# Patient Record
Sex: FEMALE | Race: ASIAN | NOT HISPANIC OR LATINO | Employment: FULL TIME | URBAN - METROPOLITAN AREA
[De-identification: names, ages, dates, MRNs, and addresses within clinical notes are randomized per-mention and may not be internally consistent; named-entity substitution may affect disease eponyms.]

---

## 2021-11-08 ENCOUNTER — APPOINTMENT (OUTPATIENT)
Dept: RADIOLOGY | Facility: HOSPITAL | Age: 21
End: 2021-11-08
Attending: EMERGENCY MEDICINE
Payer: COMMERCIAL

## 2021-11-08 ENCOUNTER — HOSPITAL ENCOUNTER (EMERGENCY)
Facility: HOSPITAL | Age: 21
Discharge: HOME | End: 2021-11-08
Attending: EMERGENCY MEDICINE | Admitting: EMERGENCY MEDICINE
Payer: COMMERCIAL

## 2021-11-08 VITALS
WEIGHT: 180 LBS | HEART RATE: 59 BPM | TEMPERATURE: 97.8 F | DIASTOLIC BLOOD PRESSURE: 95 MMHG | RESPIRATION RATE: 12 BRPM | OXYGEN SATURATION: 99 % | BODY MASS INDEX: 33.13 KG/M2 | HEIGHT: 62 IN | SYSTOLIC BLOOD PRESSURE: 150 MMHG

## 2021-11-08 DIAGNOSIS — R07.82 INTERCOSTAL PAIN: ICD-10-CM

## 2021-11-08 DIAGNOSIS — R07.89 CHEST WALL PAIN: ICD-10-CM

## 2021-11-08 LAB
ALBUMIN SERPL-MCNC: 4.5 G/DL (ref 3.5–5)
ALP SERPL-CCNC: 81 U/L (ref 45–120)
ALT SERPL W P-5'-P-CCNC: 19 U/L (ref 0–45)
ANION GAP SERPL CALCULATED.3IONS-SCNC: 7 MMOL/L (ref 5–18)
AST SERPL W P-5'-P-CCNC: 18 U/L (ref 0–40)
ATRIAL RATE - MUSE: 64 BPM
BASOPHILS # BLD AUTO: 0 10E3/UL (ref 0–0.2)
BASOPHILS NFR BLD AUTO: 1 %
BILIRUB SERPL-MCNC: 0.5 MG/DL (ref 0–1)
BUN SERPL-MCNC: 10 MG/DL (ref 8–22)
CALCIUM SERPL-MCNC: 9.9 MG/DL (ref 8.5–10.5)
CHLORIDE BLD-SCNC: 105 MMOL/L (ref 98–107)
CO2 SERPL-SCNC: 27 MMOL/L (ref 22–31)
CREAT SERPL-MCNC: 0.8 MG/DL (ref 0.6–1.1)
D DIMER PPP FEU-MCNC: 0.3 UG/ML FEU (ref 0–0.5)
DIASTOLIC BLOOD PRESSURE - MUSE: NORMAL MMHG
EOSINOPHIL # BLD AUTO: 0.1 10E3/UL (ref 0–0.7)
EOSINOPHIL NFR BLD AUTO: 2 %
ERYTHROCYTE [DISTWIDTH] IN BLOOD BY AUTOMATED COUNT: 12.4 % (ref 10–15)
GFR SERPL CREATININE-BSD FRML MDRD: >90 ML/MIN/1.73M2
GLUCOSE BLD-MCNC: 98 MG/DL (ref 70–125)
HCG UR QL: NEGATIVE
HCT VFR BLD AUTO: 42.6 % (ref 35–47)
HGB BLD-MCNC: 14.1 G/DL (ref 11.7–15.7)
IMM GRANULOCYTES # BLD: 0 10E3/UL
IMM GRANULOCYTES NFR BLD: 0 %
INTERNAL QC OK POCT: NORMAL
INTERPRETATION ECG - MUSE: NORMAL
LIPASE SERPL-CCNC: <9 U/L (ref 0–52)
LYMPHOCYTES # BLD AUTO: 2.2 10E3/UL (ref 0.8–5.3)
LYMPHOCYTES NFR BLD AUTO: 34 %
MCH RBC QN AUTO: 28.8 PG (ref 26.5–33)
MCHC RBC AUTO-ENTMCNC: 33.1 G/DL (ref 31.5–36.5)
MCV RBC AUTO: 87 FL (ref 78–100)
MONOCYTES # BLD AUTO: 0.5 10E3/UL (ref 0–1.3)
MONOCYTES NFR BLD AUTO: 8 %
NEUTROPHILS # BLD AUTO: 3.6 10E3/UL (ref 1.6–8.3)
NEUTROPHILS NFR BLD AUTO: 55 %
NRBC # BLD AUTO: 0 10E3/UL
NRBC BLD AUTO-RTO: 0 /100
P AXIS - MUSE: 36 DEGREES
PLATELET # BLD AUTO: 324 10E3/UL (ref 150–450)
POTASSIUM BLD-SCNC: 3.8 MMOL/L (ref 3.5–5)
PR INTERVAL - MUSE: 136 MS
PROT SERPL-MCNC: 7.8 G/DL (ref 6–8)
QRS DURATION - MUSE: 80 MS
QT - MUSE: 430 MS
QTC - MUSE: 443 MS
R AXIS - MUSE: 102 DEGREES
RBC # BLD AUTO: 4.9 10E6/UL (ref 3.8–5.2)
SODIUM SERPL-SCNC: 139 MMOL/L (ref 136–145)
SYSTOLIC BLOOD PRESSURE - MUSE: NORMAL MMHG
T AXIS - MUSE: 37 DEGREES
TROPONIN I SERPL-MCNC: <0.01 NG/ML (ref 0–0.29)
VENTRICULAR RATE- MUSE: 64 BPM
WBC # BLD AUTO: 6.5 10E3/UL (ref 4–11)

## 2021-11-08 PROCEDURE — 36415 COLL VENOUS BLD VENIPUNCTURE: CPT | Performed by: EMERGENCY MEDICINE

## 2021-11-08 PROCEDURE — 96374 THER/PROPH/DIAG INJ IV PUSH: CPT

## 2021-11-08 PROCEDURE — 80053 COMPREHEN METABOLIC PANEL: CPT | Performed by: EMERGENCY MEDICINE

## 2021-11-08 PROCEDURE — 85025 COMPLETE CBC W/AUTO DIFF WBC: CPT | Performed by: EMERGENCY MEDICINE

## 2021-11-08 PROCEDURE — 71046 X-RAY EXAM CHEST 2 VIEWS: CPT

## 2021-11-08 PROCEDURE — 99285 EMERGENCY DEPT VISIT HI MDM: CPT | Mod: 25

## 2021-11-08 PROCEDURE — 83690 ASSAY OF LIPASE: CPT | Performed by: EMERGENCY MEDICINE

## 2021-11-08 PROCEDURE — 81025 URINE PREGNANCY TEST: CPT | Performed by: EMERGENCY MEDICINE

## 2021-11-08 PROCEDURE — 84484 ASSAY OF TROPONIN QUANT: CPT | Performed by: EMERGENCY MEDICINE

## 2021-11-08 PROCEDURE — 93005 ELECTROCARDIOGRAM TRACING: CPT | Performed by: EMERGENCY MEDICINE

## 2021-11-08 PROCEDURE — 85379 FIBRIN DEGRADATION QUANT: CPT | Performed by: EMERGENCY MEDICINE

## 2021-11-08 PROCEDURE — 250N000011 HC RX IP 250 OP 636: Performed by: EMERGENCY MEDICINE

## 2021-11-08 PROCEDURE — 93005 ELECTROCARDIOGRAM TRACING: CPT | Performed by: STUDENT IN AN ORGANIZED HEALTH CARE EDUCATION/TRAINING PROGRAM

## 2021-11-08 RX ORDER — KETOROLAC TROMETHAMINE 15 MG/ML
15 INJECTION, SOLUTION INTRAMUSCULAR; INTRAVENOUS ONCE
Status: COMPLETED | OUTPATIENT
Start: 2021-11-08 | End: 2021-11-08

## 2021-11-08 RX ADMIN — KETOROLAC TROMETHAMINE 15 MG: 15 INJECTION, SOLUTION INTRAMUSCULAR; INTRAVENOUS at 21:41

## 2021-11-08 ASSESSMENT — ENCOUNTER SYMPTOMS
VOMITING: 0
BACK PAIN: 1
FATIGUE: 1
NAUSEA: 0
COUGH: 0
BLOOD IN STOOL: 1
SHORTNESS OF BREATH: 1
FEVER: 0

## 2021-11-08 ASSESSMENT — MIFFLIN-ST. JEOR: SCORE: 1529.72

## 2021-11-09 NOTE — ED NOTES
She says the pain started four days ago when she woke up. She had not been doing anything at the time the pain started. Nor had she been doing anything she feels could have caused a muscle strain. The pain is worse when she move is her left arm around. She states the pain is left chest radiating to mid low left back.

## 2021-11-09 NOTE — ED PROVIDER NOTES
NAME: Elisabet Logan  AGE: 21 year old female  YOB: 2000  MRN: [id number]  EVALUATION DATE & TIME: 2021  8:49 PM    PCP: No primary care provider on file.    ED PROVIDER: Grayson Mc M.D.      Chief Complaint   Patient presents with     Chest Pain     FINAL IMPRESSION:  1. Intercostal pain    2. Chest wall pain      MEDICAL DECISION MAKIN:10 PM I met with the patient, obtained history, performed an initial exam, and discussed options and plan for diagnostics and treatment here in the ED.   10:17 PM I rechecked on the patient and the Toradol helped with her pain.      Pertinent Labs & Imaging studies reviewed. (See chart for details)       Patient was clinically assessed and consented to treatment. After assessment, medical decision making and workup were discussed with the patient. The patient was agreeable to plan for testing, workup, and treatment.    Elisabet Logan is a 21 year old female who presents with chest pain   Differential diagnosis includes but not limited to acute coronary syndrome, pleuritis, pulmonary embolism, musculoskeletal pain, pneumonia.  Patient presenting for 3 days of chest pain has been mostly constant aching.  She is tender when she pushes on the muscle but also feels slightly deeper.  No nausea or vomiting, no burning, she does feel it when she takes deeper breaths but does not service sharp or pleuritic.  Patient has no past medical problems and EKG did not show any signs of ischemia.  No congenital arrhythmias.  Labs were obtained including D-dimer and showed negative D-dimer for pulmonary embolism along with negative troponin and unremarkable CBC, metabolic panel, and lipase.  After negative D-dimer and a dose of Toradol patient did report the Toradol seemed to help with the pain and chest x-ray was obtained.  Chest x-ray was clear and feel patient likely with chest wall pain would be plan for discharge home with follow-up to primary care for  further work-up.  Patient only slightly hypertensive here in the ER and I would not worry about repeat troponin given that she has had 3 days of constant pain along with no history of high blood pressure.  Patient is also not pregnant and no concerns from that standpoint causing chest pain.  Signs are otherwise within normal limits fractionation and pulse and after some relief with Toradol will be plan for discharge home with recommendations for ibuprofen as needed and close follow-up to establish primary care and follow-up on pain.    The importance of close follow up was discussed. We reviewed warning signs and symptoms, and I instructed Ms. Jn Logan to return to the emergency department immediately if she develops any new or worsening symptoms. I provided additional verbal discharge instructions. Ms. nJ Logan expressed understanding and agreement with this plan of care, her questions were answered, and she was discharged in stable condition.       0 minutes of critical care time    MEDICATIONS GIVEN IN THE EMERGENCY:  Medications   ketorolac (TORADOL) injection 15 mg (15 mg Intravenous Given 11/8/21 2141)       NEW PRESCRIPTIONS STARTED AT TODAY'S ER VISIT:  Discharge Medication List as of 11/8/2021 10:39 PM             =================================================================    HPI    Patient information was obtained from: Patient    Use of : N/A       Elisabet DAVID Jn Logan is a 21 year old female with no pertinent past medical history, who presents with chest pain.    The patient reports left-sided chest pain for the last two days.  She reports she awoke with the pain on initial onset, and the pain has been constant and radiates to her back.  She denies the pain as a squeezing. She reports it is worse with breathing deeply.  She tried cupping to the area without relief of pain.  She reports associated shortness of breath, fatigue, and intermittent racing HR.  She also reports one bright red bloody  bowel movement recently.  She denies any medical problems.  She is not on birth control or a smoker.  No pregnancy concern.  No recent long travel.  She denies any nausea, vomiting, rash, cough, fevers, leg pain or swelling, or other complaints at this time.      REVIEW OF SYSTEMS   Review of Systems   Constitutional: Positive for fatigue. Negative for fever.   Respiratory: Positive for shortness of breath. Negative for cough.    Cardiovascular: Positive for chest pain (left into back). Negative for leg swelling.        Positive for intermittent racing HR     Gastrointestinal: Positive for blood in stool (bright red, one time). Negative for nausea and vomiting.   Musculoskeletal: Positive for back pain. Gait problem: left from radiating CP.        Negative for leg pain   Skin: Negative for rash.   All other systems reviewed and are negative.     PAST MEDICAL HISTORY:  Denies    PAST SURGICAL HISTORY:  No past surgical history on file.    CURRENT MEDICATIONS:    No current facility-administered medications for this encounter.    Current Outpatient Medications:      phenazopyridine (PYRIDIUM) 200 MG tablet, [PHENAZOPYRIDINE (PYRIDIUM) 200 MG TABLET] Take 1 tablet (200 mg total) by mouth 3 (three) times a day., Disp: 6 tablet, Rfl: 0    ALLERGIES:  No Known Allergies    FAMILY HISTORY:  No family history on file.    SOCIAL HISTORY:   Social History     Socioeconomic History     Marital status:      Spouse name: Not on file     Number of children: Not on file     Years of education: Not on file     Highest education level: Not on file   Occupational History     Not on file   Tobacco Use     Smoking status: Not on file     Smokeless tobacco: Not on file   Substance and Sexual Activity     Alcohol use: Not on file     Drug use: Not on file     Sexual activity: Not on file   Other Topics Concern     Not on file   Social History Narrative     Not on file     Social Determinants of Health     Financial Resource  "Strain: Not on file   Food Insecurity: Not on file   Transportation Needs: Not on file   Physical Activity: Not on file   Stress: Not on file   Social Connections: Not on file   Intimate Partner Violence: Not on file   Housing Stability: Not on file       PHYSICAL EXAM:    Vitals: BP (!) 150/95   Pulse 59   Temp 97.8  F (36.6  C) (Temporal)   Resp 12   Ht 1.575 m (5' 2\")   Wt 81.6 kg (180 lb)   LMP 11/08/2021   SpO2 99%   BMI 32.92 kg/m     Physical Exam  Vitals and nursing note reviewed.   Constitutional:       General: She is not in acute distress.     Appearance: She is well-developed and normal weight. She is not ill-appearing or toxic-appearing.   HENT:      Head: Normocephalic.   Cardiovascular:      Rate and Rhythm: Normal rate and regular rhythm.      Heart sounds: Normal heart sounds.   Pulmonary:      Effort: Pulmonary effort is normal. No tachypnea, accessory muscle usage or respiratory distress.      Breath sounds: Normal breath sounds. No stridor.   Chest:      Chest wall: Tenderness present. No crepitus.       Abdominal:      Palpations: Abdomen is soft.      Tenderness: There is no abdominal tenderness.   Musculoskeletal:         General: Normal range of motion.      Cervical back: Normal range of motion.      Right lower leg: No tenderness. No edema.      Left lower leg: No tenderness. No edema.   Skin:     General: Skin is warm and dry.      Coloration: Skin is not cyanotic or pale.   Neurological:      General: No focal deficit present.      Mental Status: She is alert.   Psychiatric:         Mood and Affect: Mood normal.           LAB:  All pertinent labs reviewed and interpreted.  Labs Ordered and Resulted from Time of ED Arrival to Time of ED Departure   D DIMER QUANTITATIVE - Normal       Result Value    D-Dimer Quantitative 0.30     COMPREHENSIVE METABOLIC PANEL - Normal    Sodium 139      Potassium 3.8      Chloride 105      Carbon Dioxide (CO2) 27      Anion Gap 7      Urea Nitrogen " 10      Creatinine 0.80      Calcium 9.9      Glucose 98      Alkaline Phosphatase 81      AST 18      ALT 19      Protein Total 7.8      Albumin 4.5      Bilirubin Total 0.5      GFR Estimate >90     LIPASE - Normal    Lipase <9     TROPONIN I - Normal    Troponin I <0.01     HCG QUALITATIVE URINE POCT - Normal    HCG Qual Urine Negative      Internal QC Check POCT Valid     CBC WITH PLATELETS AND DIFFERENTIAL    WBC Count 6.5      RBC Count 4.90      Hemoglobin 14.1      Hematocrit 42.6      MCV 87      MCH 28.8      MCHC 33.1      RDW 12.4      Platelet Count 324      % Neutrophils 55      % Lymphocytes 34      % Monocytes 8      % Eosinophils 2      % Basophils 1      % Immature Granulocytes 0      NRBCs per 100 WBC 0      Absolute Neutrophils 3.6      Absolute Lymphocytes 2.2      Absolute Monocytes 0.5      Absolute Eosinophils 0.1      Absolute Basophils 0.0      Absolute Immature Granulocytes 0.0      Absolute NRBCs 0.0         RADIOLOGY:  XR Chest 2 Views   Final Result   IMPRESSION: Negative chest.        EKG:   Performed at: 7:30 PM on November 8, 2021.  Impression: Sinus rhythm with sinus arrhythmia, no signs of acute ischemia or congenital arrhythmia.  Rate: 64 bpm  Rhythm: Sinus rhythm with sinus arrhythmia  QRS Interval: 80 ms  QTc Interval: 443 ms  Comparison: No old EKG for comparison.  I have independently reviewed and interpreted the EKG(s) documented above.     PROCEDURES:   Procedures       I, Davion Tinsley, am serving as a scribe to document services personally performed by Dr. Grayson Mc  based on my observation and the provider's statements to me. I, Grayson Mc MD attest that Davion Tinsley is acting in a scribe capacity, has observed my performance of the services and has documented them in accordance with my direction.      Grayson Mc M.D.  Emergency Medicine  [facility] EMERGENCY DEPARTMENT  [address]  [place] [address]  [phone]  Dept: [phone]     Grayson Mc MD  11/09/21 040

## 2021-11-09 NOTE — ED TRIAGE NOTES
Pt reports left sided chest squeezing that has been constant since Friday. Pt also reports x1 BM today with bright red blood noted. Denies hemorrhoids and having blood in stool before.    Denies medical hx. Denies hx of blood clots in lungs or legs.    Will obtain EKG in triage.

## 2023-02-08 LAB
HEPATITIS B SURFACE ANTIGEN (EXTERNAL): NONREACTIVE
HIV1+2 AB SERPL QL IA: NONREACTIVE
RUBELLA ANTIBODY IGG (EXTERNAL): NORMAL
TREPONEMA PALLIDUM ANTIBODY (EXTERNAL): NONREACTIVE

## 2023-05-24 ENCOUNTER — MEDICAL CORRESPONDENCE (OUTPATIENT)
Dept: HEALTH INFORMATION MANAGEMENT | Facility: CLINIC | Age: 23
End: 2023-05-24

## 2023-05-25 ENCOUNTER — TRANSCRIBE ORDERS (OUTPATIENT)
Dept: MATERNAL FETAL MEDICINE | Facility: HOSPITAL | Age: 23
End: 2023-05-25

## 2023-05-25 DIAGNOSIS — O26.90 PREGNANCY RELATED CONDITION, ANTEPARTUM: Primary | ICD-10-CM

## 2023-06-01 ENCOUNTER — PRE VISIT (OUTPATIENT)
Dept: MATERNAL FETAL MEDICINE | Facility: HOSPITAL | Age: 23
End: 2023-06-01

## 2023-06-05 ENCOUNTER — ANCILLARY PROCEDURE (OUTPATIENT)
Dept: ULTRASOUND IMAGING | Facility: HOSPITAL | Age: 23
End: 2023-06-05
Attending: OBSTETRICS & GYNECOLOGY
Payer: COMMERCIAL

## 2023-06-05 ENCOUNTER — OFFICE VISIT (OUTPATIENT)
Dept: MATERNAL FETAL MEDICINE | Facility: HOSPITAL | Age: 23
End: 2023-06-05
Attending: OBSTETRICS & GYNECOLOGY
Payer: COMMERCIAL

## 2023-06-05 DIAGNOSIS — O26.90 PREGNANCY RELATED CONDITION, ANTEPARTUM: ICD-10-CM

## 2023-06-05 DIAGNOSIS — Z03.73 SUSPECTED FETAL ANOMALY NOT FOUND: Primary | ICD-10-CM

## 2023-06-05 PROCEDURE — 99207 PR NO CHARGE LOS: CPT | Performed by: OBSTETRICS & GYNECOLOGY

## 2023-06-05 PROCEDURE — 76811 OB US DETAILED SNGL FETUS: CPT | Mod: 26 | Performed by: OBSTETRICS & GYNECOLOGY

## 2023-06-05 PROCEDURE — 76811 OB US DETAILED SNGL FETUS: CPT

## 2023-06-05 NOTE — NURSING NOTE
Elisabet Ragsdale Desmond is a  at 24w1d who presents to [facility] for L2 ultrasound. Pt reports positive fetal movement. Pt denies bldg/lof/change in discharge, contractions, headache, vision changes, chest pain/SOB or edema. SBAR given to Dr. Jin, see note in Epic.

## 2023-06-05 NOTE — PROGRESS NOTES
Please see the imaging tab for details of the ultrasound performed today.    Virgie Jin MD  Specialist in Maternal-Fetal Medicine

## 2023-06-08 ENCOUNTER — TRANSFERRED RECORDS (OUTPATIENT)
Dept: HEALTH INFORMATION MANAGEMENT | Facility: CLINIC | Age: 23
End: 2023-06-08
Payer: COMMERCIAL

## 2023-06-26 ENCOUNTER — ANCILLARY PROCEDURE (OUTPATIENT)
Dept: ULTRASOUND IMAGING | Facility: HOSPITAL | Age: 23
End: 2023-06-26
Attending: OBSTETRICS & GYNECOLOGY
Payer: COMMERCIAL

## 2023-06-26 ENCOUNTER — OFFICE VISIT (OUTPATIENT)
Dept: MATERNAL FETAL MEDICINE | Facility: HOSPITAL | Age: 23
End: 2023-06-26
Attending: OBSTETRICS & GYNECOLOGY
Payer: COMMERCIAL

## 2023-06-26 DIAGNOSIS — Z36.2 ENCOUNTER FOR FOLLOW-UP ULTRASOUND OF FETAL ANATOMY: Primary | ICD-10-CM

## 2023-06-26 DIAGNOSIS — Z03.73 SUSPECTED FETAL ANOMALY NOT FOUND: ICD-10-CM

## 2023-06-26 PROCEDURE — 76816 OB US FOLLOW-UP PER FETUS: CPT | Mod: 26 | Performed by: STUDENT IN AN ORGANIZED HEALTH CARE EDUCATION/TRAINING PROGRAM

## 2023-06-26 PROCEDURE — 99207 PR NO CHARGE LOS: CPT | Performed by: STUDENT IN AN ORGANIZED HEALTH CARE EDUCATION/TRAINING PROGRAM

## 2023-06-26 PROCEDURE — 76816 OB US FOLLOW-UP PER FETUS: CPT

## 2023-06-26 NOTE — PROGRESS NOTES
Please see the full imaging report from the [employer] program under the imaging tab.    Savanna Ortega MD  Maternal Fetal Medicine

## 2023-06-26 NOTE — PROGRESS NOTES
Elisabet Ragsdale Desmond is a  at 27w1d who presents to [facility] for follow up growth US. Pt reports positive fetal movement. Pt denies bldg/lof/change in discharge, contractions, headache, vision changes, chest pain/SOB or edema. SBAR given to Dr. Otrega, see note in Epic.

## 2023-07-31 ENCOUNTER — HOSPITAL ENCOUNTER (OUTPATIENT)
Facility: HOSPITAL | Age: 23
End: 2023-07-31
Admitting: ADVANCED PRACTICE MIDWIFE
Payer: COMMERCIAL

## 2023-08-06 ENCOUNTER — HEALTH MAINTENANCE LETTER (OUTPATIENT)
Age: 23
End: 2023-08-06

## 2023-09-12 ENCOUNTER — HOSPITAL ENCOUNTER (INPATIENT)
Facility: CLINIC | Age: 23
LOS: 3 days | Discharge: HOME | End: 2023-09-15
Attending: ADVANCED PRACTICE MIDWIFE | Admitting: ADVANCED PRACTICE MIDWIFE
Payer: COMMERCIAL

## 2023-09-12 DIAGNOSIS — O13.3 GESTATIONAL HYPERTENSION, THIRD TRIMESTER: ICD-10-CM

## 2023-09-12 PROBLEM — Z34.90 ENCOUNTER FOR INDUCTION OF LABOR: Status: ACTIVE | Noted: 2023-09-12

## 2023-09-12 PROBLEM — Z36.89 ENCOUNTER FOR TRIAGE IN PREGNANT PATIENT: Status: ACTIVE | Noted: 2023-09-12

## 2023-09-12 LAB
ALBUMIN MFR UR ELPH: 9.4 MG/DL
ALBUMIN SERPL BCG-MCNC: 3.6 G/DL (ref 3.5–5.2)
ALP SERPL-CCNC: 187 U/L (ref 35–104)
ALT SERPL W P-5'-P-CCNC: 7 U/L (ref 0–50)
ANION GAP SERPL CALCULATED.3IONS-SCNC: 12 MMOL/L (ref 7–15)
AST SERPL W P-5'-P-CCNC: 16 U/L (ref 0–45)
BILIRUB SERPL-MCNC: 0.2 MG/DL
BUN SERPL-MCNC: 6.7 MG/DL (ref 6–20)
CALCIUM SERPL-MCNC: 8.6 MG/DL (ref 8.6–10)
CHLORIDE SERPL-SCNC: 105 MMOL/L (ref 98–107)
CREAT SERPL-MCNC: 0.6 MG/DL (ref 0.51–0.95)
CREAT UR-MCNC: 33.9 MG/DL
DEPRECATED HCO3 PLAS-SCNC: 23 MMOL/L (ref 22–29)
EGFRCR SERPLBLD CKD-EPI 2021: >90 ML/MIN/1.73M2
ERYTHROCYTE [DISTWIDTH] IN BLOOD BY AUTOMATED COUNT: 13.1 % (ref 10–15)
GLUCOSE SERPL-MCNC: 96 MG/DL (ref 70–99)
HCT VFR BLD AUTO: 34.6 % (ref 35–47)
HGB BLD-MCNC: 11.3 G/DL (ref 11.7–15.7)
HOLD SPECIMEN: NORMAL
HOLD SPECIMEN: NORMAL
MCH RBC QN AUTO: 28.4 PG (ref 26.5–33)
MCHC RBC AUTO-ENTMCNC: 32.7 G/DL (ref 31.5–36.5)
MCV RBC AUTO: 87 FL (ref 78–100)
PLATELET # BLD AUTO: 196 10E3/UL (ref 150–450)
POTASSIUM SERPL-SCNC: 4.2 MMOL/L (ref 3.4–5.3)
PROT SERPL-MCNC: 6.3 G/DL (ref 6.4–8.3)
PROT/CREAT 24H UR: 0.28 MG/MG CR (ref 0–0.2)
RBC # BLD AUTO: 3.98 10E6/UL (ref 3.8–5.2)
SODIUM SERPL-SCNC: 140 MMOL/L (ref 136–145)
WBC # BLD AUTO: 5.5 10E3/UL (ref 4–11)

## 2023-09-12 PROCEDURE — 80053 COMPREHEN METABOLIC PANEL: CPT | Performed by: ADVANCED PRACTICE MIDWIFE

## 2023-09-12 PROCEDURE — 86901 BLOOD TYPING SEROLOGIC RH(D): CPT | Performed by: REGISTERED NURSE

## 2023-09-12 PROCEDURE — 84156 ASSAY OF PROTEIN URINE: CPT | Performed by: ADVANCED PRACTICE MIDWIFE

## 2023-09-12 PROCEDURE — 120N000001 HC R&B MED SURG/OB

## 2023-09-12 PROCEDURE — 86850 RBC ANTIBODY SCREEN: CPT | Performed by: REGISTERED NURSE

## 2023-09-12 PROCEDURE — 36415 COLL VENOUS BLD VENIPUNCTURE: CPT | Performed by: ADVANCED PRACTICE MIDWIFE

## 2023-09-12 PROCEDURE — 3E0P7GC INTRODUCTION OF OTHER THERAPEUTIC SUBSTANCE INTO FEMALE REPRODUCTIVE, VIA NATURAL OR ARTIFICIAL OPENING: ICD-10-PCS | Performed by: ADVANCED PRACTICE MIDWIFE

## 2023-09-12 PROCEDURE — 250N000013 HC RX MED GY IP 250 OP 250 PS 637: Performed by: ADVANCED PRACTICE MIDWIFE

## 2023-09-12 PROCEDURE — 85027 COMPLETE CBC AUTOMATED: CPT | Performed by: ADVANCED PRACTICE MIDWIFE

## 2023-09-12 RX ORDER — METOCLOPRAMIDE HYDROCHLORIDE 5 MG/ML
10 INJECTION INTRAMUSCULAR; INTRAVENOUS EVERY 6 HOURS PRN
Status: DISCONTINUED | OUTPATIENT
Start: 2023-09-12 | End: 2023-09-13 | Stop reason: HOSPADM

## 2023-09-12 RX ORDER — IBUPROFEN 800 MG/1
800 TABLET, FILM COATED ORAL
Status: DISCONTINUED | OUTPATIENT
Start: 2023-09-12 | End: 2023-09-13 | Stop reason: HOSPADM

## 2023-09-12 RX ORDER — ACETAMINOPHEN 325 MG/1
650 TABLET ORAL EVERY 4 HOURS PRN
Status: DISCONTINUED | OUTPATIENT
Start: 2023-09-12 | End: 2023-09-13 | Stop reason: HOSPADM

## 2023-09-12 RX ORDER — NALOXONE HYDROCHLORIDE 0.4 MG/ML
0.2 INJECTION, SOLUTION INTRAMUSCULAR; INTRAVENOUS; SUBCUTANEOUS
Status: DISCONTINUED | OUTPATIENT
Start: 2023-09-12 | End: 2023-09-13 | Stop reason: HOSPADM

## 2023-09-12 RX ORDER — MISOPROSTOL 200 UG/1
800 TABLET ORAL
Status: DISCONTINUED | OUTPATIENT
Start: 2023-09-12 | End: 2023-09-13 | Stop reason: HOSPADM

## 2023-09-12 RX ORDER — NALOXONE HYDROCHLORIDE 0.4 MG/ML
0.4 INJECTION, SOLUTION INTRAMUSCULAR; INTRAVENOUS; SUBCUTANEOUS
Status: DISCONTINUED | OUTPATIENT
Start: 2023-09-12 | End: 2023-09-13 | Stop reason: HOSPADM

## 2023-09-12 RX ORDER — METHYLERGONOVINE MALEATE 0.2 MG/ML
200 INJECTION INTRAVENOUS
Status: DISCONTINUED | OUTPATIENT
Start: 2023-09-12 | End: 2023-09-13 | Stop reason: HOSPADM

## 2023-09-12 RX ORDER — CALCIUM CARBONATE 500 MG/1
500 TABLET, CHEWABLE ORAL 3 TIMES DAILY PRN
Status: DISCONTINUED | OUTPATIENT
Start: 2023-09-12 | End: 2023-09-13 | Stop reason: HOSPADM

## 2023-09-12 RX ORDER — ONDANSETRON 4 MG/1
4 TABLET, ORALLY DISINTEGRATING ORAL EVERY 6 HOURS PRN
Status: DISCONTINUED | OUTPATIENT
Start: 2023-09-12 | End: 2023-09-13 | Stop reason: HOSPADM

## 2023-09-12 RX ORDER — LABETALOL HYDROCHLORIDE 5 MG/ML
20 INJECTION, SOLUTION INTRAVENOUS
Status: DISCONTINUED | OUTPATIENT
Start: 2023-09-12 | End: 2023-09-13 | Stop reason: HOSPADM

## 2023-09-12 RX ORDER — SODIUM CHLORIDE, SODIUM LACTATE, POTASSIUM CHLORIDE, CALCIUM CHLORIDE 600; 310; 30; 20 MG/100ML; MG/100ML; MG/100ML; MG/100ML
10-125 INJECTION, SOLUTION INTRAVENOUS CONTINUOUS
Status: DISCONTINUED | OUTPATIENT
Start: 2023-09-12 | End: 2023-09-13 | Stop reason: HOSPADM

## 2023-09-12 RX ORDER — KETOROLAC TROMETHAMINE 30 MG/ML
30 INJECTION, SOLUTION INTRAMUSCULAR; INTRAVENOUS
Status: DISCONTINUED | OUTPATIENT
Start: 2023-09-12 | End: 2023-09-13 | Stop reason: HOSPADM

## 2023-09-12 RX ORDER — MISOPROSTOL 200 UG/1
400 TABLET ORAL
Status: DISCONTINUED | OUTPATIENT
Start: 2023-09-12 | End: 2023-09-13 | Stop reason: HOSPADM

## 2023-09-12 RX ORDER — CITRIC ACID/SODIUM CITRATE 334-500MG
30 SOLUTION, ORAL ORAL
Status: DISCONTINUED | OUTPATIENT
Start: 2023-09-12 | End: 2023-09-13 | Stop reason: HOSPADM

## 2023-09-12 RX ORDER — PROCHLORPERAZINE 25 MG
25 SUPPOSITORY, RECTAL RECTAL EVERY 12 HOURS PRN
Status: DISCONTINUED | OUTPATIENT
Start: 2023-09-12 | End: 2023-09-13 | Stop reason: HOSPADM

## 2023-09-12 RX ORDER — PROCHLORPERAZINE MALEATE 10 MG
10 TABLET ORAL EVERY 6 HOURS PRN
Status: DISCONTINUED | OUTPATIENT
Start: 2023-09-12 | End: 2023-09-13 | Stop reason: HOSPADM

## 2023-09-12 RX ORDER — ONDANSETRON 2 MG/ML
4 INJECTION INTRAMUSCULAR; INTRAVENOUS EVERY 6 HOURS PRN
Status: DISCONTINUED | OUTPATIENT
Start: 2023-09-12 | End: 2023-09-13 | Stop reason: HOSPADM

## 2023-09-12 RX ORDER — PENICILLIN G POTASSIUM 5000000 [IU]/1
5 INJECTION, POWDER, FOR SOLUTION INTRAMUSCULAR; INTRAVENOUS ONCE
Status: COMPLETED | OUTPATIENT
Start: 2023-09-12 | End: 2023-09-13

## 2023-09-12 RX ORDER — METOCLOPRAMIDE 10 MG/1
10 TABLET ORAL EVERY 6 HOURS PRN
Status: DISCONTINUED | OUTPATIENT
Start: 2023-09-12 | End: 2023-09-13 | Stop reason: HOSPADM

## 2023-09-12 RX ORDER — NIFEDIPINE 10 MG/1
10-20 CAPSULE ORAL
Status: DISCONTINUED | OUTPATIENT
Start: 2023-09-12 | End: 2023-09-13 | Stop reason: HOSPADM

## 2023-09-12 RX ORDER — FENTANYL CITRATE 50 UG/ML
50 INJECTION, SOLUTION INTRAMUSCULAR; INTRAVENOUS EVERY 30 MIN PRN
Status: DISCONTINUED | OUTPATIENT
Start: 2023-09-12 | End: 2023-09-13 | Stop reason: HOSPADM

## 2023-09-12 RX ORDER — OXYTOCIN 10 [USP'U]/ML
10 INJECTION, SOLUTION INTRAMUSCULAR; INTRAVENOUS
Status: DISCONTINUED | OUTPATIENT
Start: 2023-09-12 | End: 2023-09-13 | Stop reason: HOSPADM

## 2023-09-12 RX ORDER — OXYTOCIN/0.9 % SODIUM CHLORIDE 30/500 ML
340 PLASTIC BAG, INJECTION (ML) INTRAVENOUS CONTINUOUS PRN
Status: COMPLETED | OUTPATIENT
Start: 2023-09-12 | End: 2023-09-13

## 2023-09-12 RX ORDER — PENICILLIN G 3000000 [IU]/50ML
3 INJECTION, SOLUTION INTRAVENOUS EVERY 4 HOURS
Status: DISCONTINUED | OUTPATIENT
Start: 2023-09-12 | End: 2023-09-13 | Stop reason: HOSPADM

## 2023-09-12 RX ORDER — CARBOPROST TROMETHAMINE 250 UG/ML
250 INJECTION, SOLUTION INTRAMUSCULAR
Status: DISCONTINUED | OUTPATIENT
Start: 2023-09-12 | End: 2023-09-13 | Stop reason: HOSPADM

## 2023-09-12 RX ORDER — OXYTOCIN/0.9 % SODIUM CHLORIDE 30/500 ML
100-340 PLASTIC BAG, INJECTION (ML) INTRAVENOUS CONTINUOUS PRN
Status: DISCONTINUED | OUTPATIENT
Start: 2023-09-12 | End: 2023-09-13 | Stop reason: HOSPADM

## 2023-09-12 RX ORDER — MORPHINE SULFATE 10 MG/ML
10 INJECTION, SOLUTION INTRAMUSCULAR; INTRAVENOUS
Status: DISCONTINUED | OUTPATIENT
Start: 2023-09-12 | End: 2023-09-13 | Stop reason: HOSPADM

## 2023-09-12 RX ORDER — LIDOCAINE 40 MG/G
CREAM TOPICAL
Status: DISCONTINUED | OUTPATIENT
Start: 2023-09-12 | End: 2023-09-12 | Stop reason: HOSPADM

## 2023-09-12 RX ORDER — HYDROXYZINE HYDROCHLORIDE 25 MG/1
50 TABLET, FILM COATED ORAL
Status: DISCONTINUED | OUTPATIENT
Start: 2023-09-12 | End: 2023-09-13 | Stop reason: HOSPADM

## 2023-09-12 RX ORDER — LIDOCAINE 40 MG/G
CREAM TOPICAL
Status: DISCONTINUED | OUTPATIENT
Start: 2023-09-12 | End: 2023-09-13 | Stop reason: HOSPADM

## 2023-09-12 RX ADMIN — CALCIUM CARBONATE (ANTACID) CHEW TAB 500 MG 500 MG: 500 CHEW TAB at 20:26

## 2023-09-12 RX ADMIN — DINOPROSTONE 10 MG: 10 INSERT VAGINAL at 19:36

## 2023-09-12 RX ADMIN — HYDROXYZINE HYDROCHLORIDE 50 MG: 25 TABLET ORAL at 23:32

## 2023-09-12 ASSESSMENT — ACTIVITIES OF DAILY LIVING (ADL)
DRESSING/BATHING_DIFFICULTY: NO
WALKING_OR_CLIMBING_STAIRS_DIFFICULTY: NO
ADLS_ACUITY_SCORE: 18
CHANGE_IN_FUNCTIONAL_STATUS_SINCE_ONSET_OF_CURRENT_ILLNESS/INJURY: NO
FALL_HISTORY_WITHIN_LAST_SIX_MONTHS: NO
DIFFICULTY_EATING/SWALLOWING: NO
DOING_ERRANDS_INDEPENDENTLY_DIFFICULTY: NO
ADLS_ACUITY_SCORE: 35
CONCENTRATING,_REMEMBERING_OR_MAKING_DECISIONS_DIFFICULTY: NO
TOILETING_ISSUES: NO
WEAR_GLASSES_OR_BLIND: NO
ADLS_ACUITY_SCORE: 18
ADLS_ACUITY_SCORE: 18

## 2023-09-12 NOTE — H&P
"HISTORY AND PHYSICAL UPDATE ADMISSION EXAM    Name: Elisabet Logan  YOB: 2000  Medical Record Number: [id number]    History of Present Illness: Elisabet Logan is a 23 year old female who is 38w2d pregnant and being admitted for induction of labor, indication gHTN.  She was sent to [facility] from clinic after having a severe range BP.  She states she had a h/a on Monday, but not today. She denies changes in swelling, epigastric pain, visual disturbances, regular ctx, changes in FM, bleeding or LOF.  Last growth ultrasound was at 36w2d; EFW 42.9% 2806gm (6#3oz)    Estimated Date of Delivery: Sep 24, 2023    EGA 38w2d    OB History    Para Term  AB Living   2 0 0 0 1 0   SAB IAB Ectopic Multiple Live Births   1 0 0 0 0      # Outcome Date GA Lbr Tra/2nd Weight Sex Delivery Anes PTL Lv   2 Current            1 SAB 2022                Lab Results   Component Value Date    GCPCRT Negative 03/10/2020    HGB 11.3 (L) 2023       Prenatal Complications:   Pre-Pregnancy BMI 31      Exam:      BP (!) 148/101   Temp 98.4  F (36.9  C) (Oral)   Resp 16   Ht 1.575 m (5' 2\")   Wt 91.6 kg (202 lb)   SpO2 98%   BMI 36.95 kg/m      Fetal heart Rate Category cat 1  Contractions occ, mild    HEENT grossly normal  Neck: no lymphadenopathy or thryoidomegaly  Lungs unlabored  Heart acyanotic  ABD gravid, non-tender  EXT:  trace edema, moves freely  Vaginal exam 2/60/-2 Nance 6  Membranes: intact    Assessment: induction of labor, indication gHTN    Plan: Admit - see IP orders  -Discussed with pt that her BP's are labile. Some severe, some just elevated. If more become severe or there is a change in her status, care will be transferred to MD for MgS04 and labor management  -Discussed options for cervical ripening; pt agreed with cervidil  -Therapeutic sleep meds ordered  Prophylactic antibiotic for + GBS status when in labor/SROM  MD consultant on call Dr Nascimento aware of admit available " prn  Anticipate     Prenatal record reviewed.    Mariana Louise CNM      2023   5:57 PM

## 2023-09-12 NOTE — PROGRESS NOTES
Data: Patient presented to [facility]: 2023  4:53 PM.  Reason for maternal/fetal assessment is elevated blood pressures. Patient reports being sent over from clinic for elevated blood pressures. Patient denies visual disturbances, epigastric or RUQ pain, significant edema. Patient reports fetal movement is normal. Patient is a 38w2d .  Prenatal record reviewed. Pregnancy has been uncomplicated.    Vital signs  continue to show elevated BP . Support person is present.     Action: Verbal consent for EFM. Triage assessment completed.     Response: Patient verbalized agreement with plan. Will contact Mariana Louise with update and further orders.

## 2023-09-13 ENCOUNTER — ANESTHESIA EVENT (OUTPATIENT)
Dept: OBGYN | Facility: CLINIC | Age: 23
End: 2023-09-13
Payer: COMMERCIAL

## 2023-09-13 ENCOUNTER — ANESTHESIA (OUTPATIENT)
Dept: OBGYN | Facility: CLINIC | Age: 23
End: 2023-09-13
Payer: COMMERCIAL

## 2023-09-13 LAB
ABO/RH(D): NORMAL
ANTIBODY SCREEN: NEGATIVE
SPECIMEN EXPIRATION DATE: NORMAL

## 2023-09-13 PROCEDURE — 250N000011 HC RX IP 250 OP 636: Performed by: ANESTHESIOLOGY

## 2023-09-13 PROCEDURE — 370N000003 HC ANESTHESIA WARD SERVICE: Performed by: ANESTHESIOLOGY

## 2023-09-13 PROCEDURE — 250N000011 HC RX IP 250 OP 636: Performed by: ADVANCED PRACTICE MIDWIFE

## 2023-09-13 PROCEDURE — 722N000001 HC LABOR CARE VAGINAL DELIVERY SINGLE

## 2023-09-13 PROCEDURE — 3E0R3BZ INTRODUCTION OF ANESTHETIC AGENT INTO SPINAL CANAL, PERCUTANEOUS APPROACH: ICD-10-PCS | Performed by: ANESTHESIOLOGY

## 2023-09-13 PROCEDURE — 10907ZC DRAINAGE OF AMNIOTIC FLUID, THERAPEUTIC FROM PRODUCTS OF CONCEPTION, VIA NATURAL OR ARTIFICIAL OPENING: ICD-10-PCS | Performed by: REGISTERED NURSE

## 2023-09-13 PROCEDURE — 00HU33Z INSERTION OF INFUSION DEVICE INTO SPINAL CANAL, PERCUTANEOUS APPROACH: ICD-10-PCS | Performed by: ANESTHESIOLOGY

## 2023-09-13 PROCEDURE — 250N000013 HC RX MED GY IP 250 OP 250 PS 637: Performed by: REGISTERED NURSE

## 2023-09-13 PROCEDURE — 0HQ9XZZ REPAIR PERINEUM SKIN, EXTERNAL APPROACH: ICD-10-PCS | Performed by: REGISTERED NURSE

## 2023-09-13 PROCEDURE — 120N000001 HC R&B MED SURG/OB

## 2023-09-13 PROCEDURE — 250N000013 HC RX MED GY IP 250 OP 250 PS 637: Performed by: ADVANCED PRACTICE MIDWIFE

## 2023-09-13 PROCEDURE — 250N000009 HC RX 250: Performed by: ADVANCED PRACTICE MIDWIFE

## 2023-09-13 PROCEDURE — 258N000003 HC RX IP 258 OP 636: Performed by: ADVANCED PRACTICE MIDWIFE

## 2023-09-13 PROCEDURE — 0UQMXZZ REPAIR VULVA, EXTERNAL APPROACH: ICD-10-PCS | Performed by: REGISTERED NURSE

## 2023-09-13 RX ORDER — ENOXAPARIN SODIUM 100 MG/ML
40 INJECTION SUBCUTANEOUS EVERY 24 HOURS
Status: DISCONTINUED | OUTPATIENT
Start: 2023-09-14 | End: 2023-09-15 | Stop reason: HOSPADM

## 2023-09-13 RX ORDER — MISOPROSTOL 200 UG/1
400 TABLET ORAL
Status: DISCONTINUED | OUTPATIENT
Start: 2023-09-13 | End: 2023-09-15 | Stop reason: HOSPADM

## 2023-09-13 RX ORDER — NALBUPHINE HYDROCHLORIDE 20 MG/ML
2.5-5 INJECTION, SOLUTION INTRAMUSCULAR; INTRAVENOUS; SUBCUTANEOUS EVERY 6 HOURS PRN
Status: DISCONTINUED | OUTPATIENT
Start: 2023-09-13 | End: 2023-09-15 | Stop reason: HOSPADM

## 2023-09-13 RX ORDER — EPHEDRINE SULFATE 50 MG/ML
5 INJECTION, SOLUTION INTRAMUSCULAR; INTRAVENOUS; SUBCUTANEOUS
Status: DISCONTINUED | OUTPATIENT
Start: 2023-09-13 | End: 2023-09-13 | Stop reason: HOSPADM

## 2023-09-13 RX ORDER — FENTANYL/ROPIVACAINE/NS/PF 2MCG/ML-.1
PLASTIC BAG, INJECTION (ML) EPIDURAL
Status: DISCONTINUED | OUTPATIENT
Start: 2023-09-13 | End: 2023-09-13 | Stop reason: HOSPADM

## 2023-09-13 RX ORDER — NALOXONE HYDROCHLORIDE 0.4 MG/ML
0.4 INJECTION, SOLUTION INTRAMUSCULAR; INTRAVENOUS; SUBCUTANEOUS
Status: DISCONTINUED | OUTPATIENT
Start: 2023-09-13 | End: 2023-09-15 | Stop reason: HOSPADM

## 2023-09-13 RX ORDER — MODIFIED LANOLIN
OINTMENT (GRAM) TOPICAL
Status: DISCONTINUED | OUTPATIENT
Start: 2023-09-13 | End: 2023-09-15 | Stop reason: HOSPADM

## 2023-09-13 RX ORDER — BISACODYL 10 MG
10 SUPPOSITORY, RECTAL RECTAL DAILY PRN
Status: DISCONTINUED | OUTPATIENT
Start: 2023-09-13 | End: 2023-09-15 | Stop reason: HOSPADM

## 2023-09-13 RX ORDER — DOCUSATE SODIUM 100 MG/1
100 CAPSULE, LIQUID FILLED ORAL DAILY
Status: DISCONTINUED | OUTPATIENT
Start: 2023-09-13 | End: 2023-09-15 | Stop reason: HOSPADM

## 2023-09-13 RX ORDER — OXYTOCIN 10 [USP'U]/ML
10 INJECTION, SOLUTION INTRAMUSCULAR; INTRAVENOUS
Status: DISCONTINUED | OUTPATIENT
Start: 2023-09-13 | End: 2023-09-15 | Stop reason: HOSPADM

## 2023-09-13 RX ORDER — OXYTOCIN/0.9 % SODIUM CHLORIDE 30/500 ML
340 PLASTIC BAG, INJECTION (ML) INTRAVENOUS CONTINUOUS PRN
Status: DISCONTINUED | OUTPATIENT
Start: 2023-09-13 | End: 2023-09-15 | Stop reason: HOSPADM

## 2023-09-13 RX ORDER — CARBOPROST TROMETHAMINE 250 UG/ML
250 INJECTION, SOLUTION INTRAMUSCULAR
Status: DISCONTINUED | OUTPATIENT
Start: 2023-09-13 | End: 2023-09-15 | Stop reason: HOSPADM

## 2023-09-13 RX ORDER — NALOXONE HYDROCHLORIDE 0.4 MG/ML
0.2 INJECTION, SOLUTION INTRAMUSCULAR; INTRAVENOUS; SUBCUTANEOUS
Status: DISCONTINUED | OUTPATIENT
Start: 2023-09-13 | End: 2023-09-15 | Stop reason: HOSPADM

## 2023-09-13 RX ORDER — ACETAMINOPHEN 325 MG/1
650 TABLET ORAL EVERY 4 HOURS PRN
Status: DISCONTINUED | OUTPATIENT
Start: 2023-09-13 | End: 2023-09-15 | Stop reason: HOSPADM

## 2023-09-13 RX ORDER — IBUPROFEN 800 MG/1
800 TABLET, FILM COATED ORAL EVERY 6 HOURS PRN
Status: DISCONTINUED | OUTPATIENT
Start: 2023-09-13 | End: 2023-09-15 | Stop reason: HOSPADM

## 2023-09-13 RX ORDER — HYDROCORTISONE 25 MG/G
CREAM TOPICAL 3 TIMES DAILY PRN
Status: DISCONTINUED | OUTPATIENT
Start: 2023-09-13 | End: 2023-09-15 | Stop reason: HOSPADM

## 2023-09-13 RX ORDER — MISOPROSTOL 200 UG/1
800 TABLET ORAL
Status: DISCONTINUED | OUTPATIENT
Start: 2023-09-13 | End: 2023-09-15 | Stop reason: HOSPADM

## 2023-09-13 RX ORDER — OXYCODONE HYDROCHLORIDE 5 MG/1
5 TABLET ORAL EVERY 4 HOURS PRN
Status: DISCONTINUED | OUTPATIENT
Start: 2023-09-13 | End: 2023-09-15 | Stop reason: HOSPADM

## 2023-09-13 RX ORDER — METHYLERGONOVINE MALEATE 0.2 MG/ML
200 INJECTION INTRAVENOUS
Status: DISCONTINUED | OUTPATIENT
Start: 2023-09-13 | End: 2023-09-15 | Stop reason: HOSPADM

## 2023-09-13 RX ADMIN — PENICILLIN G 3 MILLION UNITS: 3000000 INJECTION, SOLUTION INTRAVENOUS at 13:31

## 2023-09-13 RX ADMIN — BENZOCAINE AND LEVOMENTHOL: 200; 5 SPRAY TOPICAL at 21:00

## 2023-09-13 RX ADMIN — Medication 340 ML/HR: at 14:12

## 2023-09-13 RX ADMIN — DOCUSATE SODIUM 100 MG: 100 CAPSULE, LIQUID FILLED ORAL at 15:54

## 2023-09-13 RX ADMIN — LIDOCAINE HYDROCHLORIDE 20 ML: 10 INJECTION, SOLUTION EPIDURAL; INFILTRATION; INTRACAUDAL; PERINEURAL at 14:20

## 2023-09-13 RX ADMIN — Medication: at 09:29

## 2023-09-13 RX ADMIN — IBUPROFEN 800 MG: 800 TABLET ORAL at 16:43

## 2023-09-13 RX ADMIN — CALCIUM CARBONATE (ANTACID) CHEW TAB 500 MG 500 MG: 500 CHEW TAB at 10:20

## 2023-09-13 RX ADMIN — PENICILLIN G POTASSIUM 5 MILLION UNITS: 5000000 POWDER, FOR SOLUTION INTRAMUSCULAR; INTRAPLEURAL; INTRATHECAL; INTRAVENOUS at 10:03

## 2023-09-13 RX ADMIN — ACETAMINOPHEN 650 MG: 325 TABLET, FILM COATED ORAL at 15:54

## 2023-09-13 RX ADMIN — SODIUM CHLORIDE, POTASSIUM CHLORIDE, SODIUM LACTATE AND CALCIUM CHLORIDE 1000 ML: 600; 310; 30; 20 INJECTION, SOLUTION INTRAVENOUS at 08:23

## 2023-09-13 RX ADMIN — HYDROXYZINE HYDROCHLORIDE 50 MG: 25 TABLET ORAL at 01:59

## 2023-09-13 ASSESSMENT — ACTIVITIES OF DAILY LIVING (ADL)
ADLS_ACUITY_SCORE: 18

## 2023-09-13 NOTE — PROGRESS NOTES
"Patient Name:  Elisabet Logan  :      2000  MRN:      [id number]    Assessment:     at 38w3d  IOL for GHTN- S/P Cervidil, removed at 0740  Active labor  Category 1 FHTs  Intact membranes  GBS positive      Plan:   -Continue position changes to optimize fetal descent.  -Routine support & management.   -Epidural per patient request  -Start GBS prophylaxis  -Anticipate progress and NSVB.   -Active management of third stage     Subjective:  Elisabet Logan is coping well with contractions. Using non pharmacologic  for pain relief at this time, but would like to move forward with an epidural. Good PO fluid intake. Denies headache, visual changes, RUQ pain, N/V, SOB, and dizziness.     Voiding without issue. Family supportive at bedside.       Objective:  BP (!) 143/84 (BP Location: Right arm, Patient Position: Semi-Olson's)   Pulse 78   Temp 98.2  F (36.8  C) (Oral)   Resp 16   Ht 1.575 m (5' 2\")   Wt 91.6 kg (202 lb)   SpO2 98%   BMI 36.95 kg/m      FHR:Baseline: 135 bpm, Variability: Moderate (6 - 25 bpm), Accelerations: present and Decelerations: Absent    Uterine contractions:TocoFrequency: Every 1-6 minutes, Duration:  seconds and Intensity: moderate    SVE: 5/90/-1    Dr. Spencer remains available for consultation and collaboration as needed.      Provider:     BERE Chow  I was present for all plan of care discussion and physical exam, I agree with the above documentation. Patient consented to [facility] participation in care. Essence Ashraf CNM      Date:  2023  Time:  08:20 AM   "

## 2023-09-13 NOTE — PROVIDER NOTIFICATION
Essence Ashraf CNM, notified of patient's elevated blood pressures. Plan is to continue monitor BPs q4 hrs and notify provider if pressures fall in severe range.

## 2023-09-13 NOTE — ANESTHESIA PREPROCEDURE EVALUATION
Anesthesia Pre-Procedure Evaluation    Patient: Elisabet Logan   MRN: [id number] : 2000        Procedure :           No past medical history on file.   No past surgical history on file.   No Known Allergies   Social History     Tobacco Use    Smoking status: Not on file    Smokeless tobacco: Not on file   Substance Use Topics    Alcohol use: Not on file      Wt Readings from Last 1 Encounters:   23 91.6 kg (202 lb)        Anesthesia Evaluation   Pt has had prior anesthetic.     No history of anesthetic complications       ROS/MED HX  ENT/Pulmonary:  - neg pulmonary ROS     Neurologic:  - neg neurologic ROS     Cardiovascular:  - neg cardiovascular ROS     METS/Exercise Tolerance:     Hematologic:  - neg hematologic  ROS     Musculoskeletal:       GI/Hepatic:  - neg GI/hepatic ROS     Renal/Genitourinary:       Endo:  - neg endo ROS     Psychiatric/Substance Use:  - neg psychiatric ROS     Infectious Disease:       Malignancy:       Other:            Physical Exam    Airway        Mallampati: II   TM distance: > 3 FB   Neck ROM: full   Mouth opening: > 3 cm    Respiratory Devices and Support         Dental  no notable dental history         Cardiovascular   cardiovascular exam normal          Pulmonary   pulmonary exam normal                OUTSIDE LABS:  CBC:   Lab Results   Component Value Date    WBC 5.5 2023    WBC 6.5 2021    HGB 11.3 (L) 2023    HGB 14.1 2021    HCT 34.6 (L) 2023    HCT 42.6 2021     2023     2021     BMP:   Lab Results   Component Value Date     2023     2021    POTASSIUM 4.2 2023    POTASSIUM 3.8 2021    CHLORIDE 105 2023    CHLORIDE 105 2021    CO2 23 2023    CO2 27 2021    BUN 6.7 2023    BUN 10 2021    CR 0.60 2023    CR 0.80 2021    GLC 96 2023    GLC 98 2021     COAGS: No results found for: PTT, INR, FIBR  POC:   Lab  Results   Component Value Date    HCG Negative 11/08/2021     HEPATIC:   Lab Results   Component Value Date    ALBUMIN 3.6 09/12/2023    PROTTOTAL 6.3 (L) 09/12/2023    ALT 7 09/12/2023    AST 16 09/12/2023    ALKPHOS 187 (H) 09/12/2023    BILITOTAL 0.2 09/12/2023     OTHER:   Lab Results   Component Value Date    SAMUEL 8.6 09/12/2023    LIPASE <9 11/08/2021    CRP 0.3 03/10/2020       Anesthesia Plan    ASA Status:  2       Anesthesia Type: Epidural.              Consents    Anesthesia Plan(s) and associated risks, benefits, and realistic alternatives discussed. Questions answered and patient/representative(s) expressed understanding.     - Discussed:     - Discussed with:  Patient            Postoperative Care            Comments:           neg OB ROS.       Arron Booth MD

## 2023-09-13 NOTE — L&D DELIVERY NOTE
Vaginal Delivery Note    Name: Elisabet Logan  : 2000  MRN: [id number]    PRE DELIVERY DIAGNOSIS  1) 23 year old  2 Para 0010 at 38w3d      2) Gestational HTN  3) Pre-pregnancy BMI 31    POST DELIVERY DIAGNOSIS  1) 23 year old  @ 38w3d  2) Delivery of a viable infant weighing 7lbs 3oz  via , apgars 8 & 9    YOB: 2024     Birth Time: 2:11 PM       Augmentation No              Indication: n/a  Induction Yes                      Indication: GHTN    Monitors: External     GBS: Positive- received 2 doses PCN    ROM: SROM, AROM of forebag when completely dilated  Fluid Type: Clear    Labor Analgesia/Anesthesia:Epidural    Cord pH obtained: No  Placenta Date/Time:    Placenta submitted to Pathology: No    Description of procedure:   23 year old  with PNC w/ MWC and pregnancy complicated by  see above  presented to L&D with plan for induction.  Her hospital course was uncomplicated.  Patient  started to actively labor after removal of Cervidil at 0740, received an epidural at 0930 which gave her excellent relief, and progressed to complete with spontaneous rupture of membranes. Elisabet pushed with effective efforts for one hour and brought the baby to a slow controlled crown. Upon delivery of the head, a loose nuchal cord was reduced. The posterior arm attempted to deliver prior to the anterior shoulder, however unable to easily deliver arm completely- secondary to this, the anterior shoulder did not deliver easily with gentle downward traction. The fetal shoulders were rotated into a transverse position, then back to BRIE, after which the anterior shoulder delivered easily. paired with maternal efforts. There was not a shoulder dystocia. Elisabet Logan and Steffen welcomed their son, Eder.     Shoulder Dystocia Yes  Operative Vaginal Delivery No  Infant spontaneously delivered over a 1st degree laceration and deep right labial laceration.   Perineum was repaired in the normal  fashion using epidural anesthesia using 3-0 vicryl. Right labial was repaired with running 3-0 vicryl to ensure closure of deep separation. Hemostasis achieved.   Infant delivered in BRIE position.  Nuchal cord Yes - loose   Reduced    Placenta spontaneously delivered:   grossly intact with 3 vessel cord.  Infant:  Live, vigorous infant was handed to mom.    Delivery was complicated by nothing Interventions included fundal massage and IV pitocin.    Delivery QBL (mL): 163    Mother and Infant stable.    BERE Chow  I was present for all plan of care discussion and physical exam, I agree with the above documentation. Patient consented to [facility] participation in care. CARA Ruiz Dr. remained available for consultation and collaboration as needed.      9/13/2023 3:11 PM

## 2023-09-13 NOTE — PROVIDER NOTIFICATION
RN updated MICH Lanza CNM regarding cervadil placement and asked for BP monitoring clarification. RN to assess BPs hourly and if pt remains normotensive/elevated, RN may begin checking BPs q4hrs while patient is sleeping. If pt is severe range, or close to it, then RN is to begin checking BPs more frequently per policy and alert CNM.

## 2023-09-13 NOTE — ANESTHESIA PROCEDURE NOTES
Epidural catheter Procedure Note    Pre-Procedure   Staff -        Anesthesiologist:  Arron Booth MD       Performed By: anesthesiologist       Location: OB       Procedure Start/Stop Times: 9/13/2023 9:22 AM and 9/13/2023 9:38 AM       Pre-Anesthestic Checklist: patient identified, IV checked, site marked, risks and benefits discussed, informed consent, monitors and equipment checked and pre-op evaluation  Timeout:       Correct Patient: Yes        Correct Procedure: Yes        Correct Site: Yes        Correct Position: Yes   Procedure Documentation  Procedure: epidural catheter       Patient Position: sitting       Skin prep: Chloraprep       Local skin infiltrated with 3 mL of 2% lidocaine.        Insertion Site: L3-4. (midline approach).       Technique: LORT saline        PAYTON at 6 cm.       Needle Type: Liztic       Needle Gauge: 18.        Needle Length (Inches): 3.5        Catheter: 19 G.          Catheter threaded easily.         5 cm epidural space.         Threaded 11 cm at skin.         # of attempts: 1 and  # of redirects:     Assessment/Narrative         Paresthesias: No.       Test dose of 3 mL lidocaine 1.5% w/ 1:200,000 epinephrine at 16:32 CDT.         Test dose negative, 3 minutes after injection, for signs of intravascular, subdural, or intrathecal injection.       Insertion/Infusion Method: LORT saline       Aspiration negative for Heme or CSF via Epidural Catheter.    Medication(s) Administered   0.1% ropivacaine + 2 mcg/mL fentaNYL in NS - EPIDURAL   8 mL - 9/13/2023 9:22:00 AM  Medication Administration Time: 9/13/2023 9:22 AM     Comments:  Risks, benefits, and procedure discussed with Pt and there was agreement to proceed.  Site prepped and draped, sterile technique.  Negative CSF/heme via needle.  Catheter advanced without resistance.  Negative aspiration of catheter prior to negative test dose.  No apparent complications.  Single pass with needle.  Pt tolerated the procedure  "well.      FOR [facility] ONLY:   Pain Team Contact information: please page the Pain Team Via NodePing. Search \"Pain\". During daytime hours, please page the attending first. At night please page the resident first.      "

## 2023-09-14 PROCEDURE — 250N000013 HC RX MED GY IP 250 OP 250 PS 637: Performed by: REGISTERED NURSE

## 2023-09-14 PROCEDURE — 250N000011 HC RX IP 250 OP 636: Mod: JZ | Performed by: REGISTERED NURSE

## 2023-09-14 PROCEDURE — 120N000001 HC R&B MED SURG/OB

## 2023-09-14 RX ADMIN — IBUPROFEN 800 MG: 800 TABLET ORAL at 08:09

## 2023-09-14 RX ADMIN — ACETAMINOPHEN 650 MG: 325 TABLET, FILM COATED ORAL at 08:10

## 2023-09-14 RX ADMIN — IBUPROFEN 800 MG: 800 TABLET ORAL at 00:23

## 2023-09-14 RX ADMIN — ENOXAPARIN SODIUM 40 MG: 100 INJECTION SUBCUTANEOUS at 02:14

## 2023-09-14 RX ADMIN — IBUPROFEN 800 MG: 800 TABLET ORAL at 20:00

## 2023-09-14 RX ADMIN — DOCUSATE SODIUM 100 MG: 100 CAPSULE, LIQUID FILLED ORAL at 08:09

## 2023-09-14 RX ADMIN — ACETAMINOPHEN 650 MG: 325 TABLET, FILM COATED ORAL at 20:00

## 2023-09-14 RX ADMIN — IBUPROFEN 800 MG: 800 TABLET ORAL at 14:34

## 2023-09-14 RX ADMIN — ACETAMINOPHEN 650 MG: 325 TABLET, FILM COATED ORAL at 00:23

## 2023-09-14 RX ADMIN — ACETAMINOPHEN 650 MG: 325 TABLET, FILM COATED ORAL at 14:34

## 2023-09-14 ASSESSMENT — ACTIVITIES OF DAILY LIVING (ADL)
ADLS_ACUITY_SCORE: 18

## 2023-09-14 NOTE — PROGRESS NOTES
"Outreach Note for EPIC    Chart reviewed, discharge plan discussed with patient, needs assessed. Patient verbalizes understanding of plan, requests [facility] visit as ordered, [facility] nurse visit planned for [place], , Home Care Intake updated. Patient and , \"Eder Logan\", phone number is reported as correct in EMR.    Patient states she has good support at home, has baby care essentials, and feels ready to discharge today. Outreach RN will continue to follow and assist if needed with discharge plan. No additional needs identified at this time.      "

## 2023-09-14 NOTE — ANESTHESIA POSTPROCEDURE EVALUATION
Patient: Elisabet Logan    Procedure: * No procedures listed *       Anesthesia Type:  Epidural    Note:  Disposition: Inpatient   Postop Pain Control: Uneventful            Sign Out: Well controlled pain   PONV: No   Neuro/Psych: Uneventful            Sign Out: Acceptable/Baseline neuro status   Airway/Respiratory: Uneventful            Sign Out: Acceptable/Baseline resp. status   CV/Hemodynamics: Uneventful            Sign Out: Acceptable CV status; No obvious hypovolemia; No obvious fluid overload   Other NRE: NONE   DID A NON-ROUTINE EVENT OCCUR? No           Last vitals:  Vitals:    09/13/23 1800 09/13/23 1806 09/13/23 2000   BP: 139/88 (!) 138/90 124/74   Pulse:   100   Resp:   20   Temp:   36.6  C (97.9  F)   SpO2:   97%       Electronically Signed By: Ml Clinton MD  September 13, 2023  11:06 PM

## 2023-09-14 NOTE — PLAN OF CARE
Problem: Plan of Care - These are the overarching goals to be used throughout the patient stay.    Goal: Optimal Comfort and Wellbeing  Outcome: Progressing  Intervention: Monitor Pain and Promote Comfort  Recent Flowsheet Documentation  Taken 9/14/2023 0400 by Briseida Cabello RN  Pain Management Interventions: care clustered  Taken 9/14/2023 0123 by Briseida Cabello RN  Pain Management Interventions: care clustered  Taken 9/14/2023 0000 by Briseida Cabello RN  Pain Management Interventions:   care clustered   emotional support   pain management plan reviewed with patient/caregiver   quiet environment facilitated   repositioned   rest   therapeutic presence  Taken 9/13/2023 2000 by Briseida Cabello RN  Pain Management Interventions:   care clustered   emotional support   pain management plan reviewed with patient/caregiver   quiet environment facilitated   repositioned   rest   therapeutic presence  Intervention: Provide Person-Centered Care  Recent Flowsheet Documentation  Taken 9/14/2023 0400 by Briseida Cabello RN  Trust Relationship/Rapport:   care explained   choices provided   emotional support provided   empathic listening provided   questions answered   questions encouraged   reassurance provided   thoughts/feelings acknowledged  Taken 9/14/2023 0000 by Briseida Cabello RN  Trust Relationship/Rapport:   care explained   choices provided   emotional support provided   empathic listening provided   questions answered   questions encouraged   reassurance provided   thoughts/feelings acknowledged  Taken 9/13/2023 2000 by Briseida Cabello RN  Trust Relationship/Rapport:   care explained   choices provided   emotional support provided   empathic listening provided   questions answered   questions encouraged   reassurance provided   thoughts/feelings acknowledged     Mom and infant stable overnight. Mom complains of cramping pain relieved with Ibuprofen and tylenol (see MAR). Mom up independently to bathroom,  voiding. Infant taking 10 ml of formula every 2-3 hours, tolerating well, no emesis. Voiding and stooling. Will continue to monitor.     CHELITA Cabello RN

## 2023-09-14 NOTE — DISCHARGE INSTRUCTIONS

## 2023-09-14 NOTE — PROGRESS NOTES
"Vaginal Delivery Postpartum Day 1    Patient Name:  Elisabet Logan  :      2000  MRN:      [id number]      Assessment:  Normal postpartum course. TN    Plan:  Continue current care.Continue to monitor BPs. Discharge tomorrow.      Subjective:  The patient feels well:  Voiding without difficulty, lochia normal, tolerating normal diet, ambulating without difficulty and passing flatus. Voiding independently without complication. Pain is well controlled with current medications.  The patient has no emotional concerns.  The baby is well and being fed by bottle.  Denies headache, vision changes or epigastric pain.    YOB: 2024   Birth Time: 2:11 PM     Prenatal Complications include:   1.BMI 31  2. GBSuria  3.gHTN    Objective:  /79 (BP Location: Left arm)   Pulse 83   Temp 98.1  F (36.7  C) (Oral)   Resp 18   Ht 1.575 m (5' 2\")   Wt 91.6 kg (202 lb)   SpO2 98%   Breastfeeding Unknown   BMI 36.95 kg/m    Patient Vitals for the past 24 hrs:   BP Temp Temp src Pulse Resp SpO2   23 0828 133/79 98.1  F (36.7  C) Oral 83 18 98 %   23 0400 129/83 98.2  F (36.8  C) Oral 62 20 98 %   23 0000 124/75 98  F (36.7  C) Oral 82 20 96 %   23 2000 124/74 97.9  F (36.6  C) Oral 100 20 97 %   23 1806 (!) 138/90 -- -- -- -- --   23 1800 139/88 -- -- -- -- --   23 1745 (!) 138/91 -- -- -- -- --   23 1730 139/89 -- -- -- -- --   23 1715 (!) 141/90 -- -- -- -- --   23 1700 (!) 148/92 -- -- -- -- 99 %   23 1645 134/83 -- -- -- -- 99 %   23 1630 135/86 -- -- -- -- 99 %   23 1615 (!) 144/93 -- -- -- -- 99 %   23 1600 135/87 -- -- -- -- 100 %   23 1545 (!) 146/92 -- -- -- -- 99 %   23 1530 (!) 140/88 -- -- -- -- 98 %   23 1515 139/89 -- -- -- -- 100 %   23 1500 139/87 98.9  F (37.2  C) Oral -- 16 100 %   23 1445 -- -- -- -- -- 100 %   23 1430 -- -- -- -- -- 97 %   23 1415 (!) 140/77 -- " -- -- -- 97 %   09/13/23 1400 -- -- -- -- -- 98 %   09/13/23 1345 129/77 98.9  F (37.2  C) Oral -- -- 98 %   09/13/23 1330 -- -- -- -- -- 99 %   09/13/23 1315 -- -- -- -- -- (!) 82 %   09/13/23 1300 -- -- -- -- -- (!) 88 %   09/13/23 1245 -- 98.3  F (36.8  C) Oral -- -- 100 %   09/13/23 1230 -- -- -- -- -- 95 %   09/13/23 1215 130/78 -- -- -- -- --   09/13/23 1200 -- -- -- -- -- 95 %   09/13/23 1145 -- -- -- -- -- 99 %   09/13/23 1130 -- -- -- -- -- 94 %       Exam: Patient A&O x 3. No acute distress, breathing unlabored.The amount and color of the lochia is appropriate for the duration of recovery. Uterine fundus is firm at U-1.    Lab Results   Component Value Date    AS Negative 09/12/2023    GCPCRT Negative 03/10/2020    HGB 11.3 (L) 09/12/2023       There is no immunization history for the selected administration types on file for this patient.    Provider:  TACOS Kennedy CNM    Date:  9/14/2023  Time:  11:26 AM

## 2023-09-15 VITALS
SYSTOLIC BLOOD PRESSURE: 136 MMHG | RESPIRATION RATE: 16 BRPM | WEIGHT: 191.9 LBS | DIASTOLIC BLOOD PRESSURE: 85 MMHG | HEIGHT: 62 IN | OXYGEN SATURATION: 98 % | HEART RATE: 69 BPM | TEMPERATURE: 97.8 F | BODY MASS INDEX: 35.31 KG/M2

## 2023-09-15 PROBLEM — O13.9 GESTATIONAL HYPERTENSION: Status: ACTIVE | Noted: 2023-09-15

## 2023-09-15 PROCEDURE — 250N000011 HC RX IP 250 OP 636: Mod: JZ | Performed by: REGISTERED NURSE

## 2023-09-15 PROCEDURE — 250N000013 HC RX MED GY IP 250 OP 250 PS 637: Performed by: REGISTERED NURSE

## 2023-09-15 RX ORDER — ACETAMINOPHEN 325 MG/1
975 TABLET ORAL EVERY 6 HOURS PRN
Qty: 50 TABLET | Refills: 0 | Status: SHIPPED | OUTPATIENT
Start: 2023-09-15

## 2023-09-15 RX ORDER — IBUPROFEN 800 MG/1
800 TABLET, FILM COATED ORAL EVERY 8 HOURS PRN
Qty: 30 TABLET | Refills: 0 | Status: SHIPPED | OUTPATIENT
Start: 2023-09-15

## 2023-09-15 RX ORDER — DOCUSATE SODIUM 100 MG/1
100 CAPSULE, LIQUID FILLED ORAL DAILY PRN
Qty: 60 CAPSULE | Refills: 0 | Status: SHIPPED | OUTPATIENT
Start: 2023-09-15

## 2023-09-15 RX ADMIN — IBUPROFEN 800 MG: 800 TABLET ORAL at 02:40

## 2023-09-15 RX ADMIN — ACETAMINOPHEN 650 MG: 325 TABLET, FILM COATED ORAL at 02:40

## 2023-09-15 RX ADMIN — ACETAMINOPHEN 650 MG: 325 TABLET, FILM COATED ORAL at 09:27

## 2023-09-15 RX ADMIN — IBUPROFEN 800 MG: 800 TABLET ORAL at 09:27

## 2023-09-15 RX ADMIN — DOCUSATE SODIUM 100 MG: 100 CAPSULE, LIQUID FILLED ORAL at 09:27

## 2023-09-15 RX ADMIN — ENOXAPARIN SODIUM 40 MG: 100 INJECTION SUBCUTANEOUS at 02:38

## 2023-09-15 ASSESSMENT — ACTIVITIES OF DAILY LIVING (ADL)
ADLS_ACUITY_SCORE: 18

## 2023-09-15 NOTE — PROGRESS NOTES
"Vaginal Delivery Postpartum Day 2    Patient Name:  Elisabet Logan  :      2000  MRN:      [id number]      Assessment:  Normal postpartum course.    Plan:  Continue current care. Discharge to home with parameters to check blood pressure and plan for follow up in clinic early next week.    Subjective:  The patient feels well:  Voiding without difficulty, lochia normal, tolerating normal diet, ambulating without difficulty and passing flatus.  She denies headache, vision changes, URQ/epigastric pain, dizziness, lightheadedness, shortness of breath, and tachycardia. Voiding independently without complication. Pain is well controlled with current medications.  The patient has no emotional concerns.  The baby is well and being fed by bottle.    YOB: 2024   Birth Time: 2:11 PM     Prenatal complications:  1.BMI 31  2. GBSuria  3.gHTN    Objective:  /85 (BP Location: Right arm, Patient Position: Semi-Olson's, Cuff Size: Adult Regular)   Pulse 69   Temp 97.8  F (36.6  C) (Oral)   Resp 16   Ht 1.575 m (5' 2\")   Wt 87 kg (191 lb 14.4 oz)   SpO2 98%   Breastfeeding Unknown   BMI 35.10 kg/m      .  Patient Vitals for the past 24 hrs:   BP Temp Temp src Pulse Resp SpO2 Weight   09/15/23 0926 136/85 -- -- 69 -- 98 % --   09/15/23 0908 (!) 145/90 97.8  F (36.6  C) Oral 68 16 98 % --   09/15/23 0300 -- -- -- -- -- -- 87 kg (191 lb 14.4 oz)   23 2251 127/63 -- -- 78 18 99 % --   23 1300 115/66 98.2  F (36.8  C) Oral 80 16 -- --       Exam: Patient A&O x 3. No acute distress, breathing unlabored.The amount and color of the lochia is appropriate for the duration of recovery.     Lab Results   Component Value Date    AS Negative 2023    GCPCRT Negative 03/10/2020    HGB 11.3 (L) 2023       There is no immunization history for the selected administration types on file for this patient.    Provider:  TACOS Veloz CNELPIDIO    Date:  9/15/2023  Time:  11:28 AM   "

## 2023-09-15 NOTE — PLAN OF CARE
Goal Outcome Evaluation:      Plan of Care Reviewed With: patient, spouse      Pt bonding with baby and vitals stable. Her fundus is midline and firm without massage and bleeding is scant. Her pain is well managed with tylenol and ibuprofen. She is formula feeding baby and plans to continue with formula at home. Patient sent home with BP cuff. Nurse educated patient on how to use it and pt was able to demonstrate understanding. Nurse gave her parameters of when to call provider.    Discharge education given to pt and spouse and question answered. Pt confirms understanding of discharge teaching. Pt was walked to front door by nurse and went home with baby and spouse.             Problem: Postpartum (Vaginal Delivery)  Goal: Absence of Infection Signs and Symptoms  Outcome: Adequate for Care Transition     Problem: Postpartum (Vaginal Delivery)  Goal: Optimal Pain Control and Function  Outcome: Adequate for Care Transition  Intervention: Prevent or Manage Pain  Recent Flowsheet Documentation  Taken 9/15/2023 0908 by Alysia Sullivan, RN  Perineal Care:   perineal hygiene encouraged   perineal spray bottle/warm water use encouraged     Problem: Postpartum (Vaginal Delivery)  Goal: Effective Urinary Elimination  Outcome: Adequate for Care Transition

## 2023-09-15 NOTE — PLAN OF CARE
Problem: Plan of Care - These are the overarching goals to be used throughout the patient stay.    Goal: Optimal Comfort and Wellbeing  Outcome: Progressing  Intervention: Monitor Pain and Promote Comfort  Recent Flowsheet Documentation  Taken 9/15/2023 0340 by Briseida Cabello RN  Pain Management Interventions: care clustered     Mom and infant stable. Mom complains of cramping pain relieved with tylenol and ibuprofen. Up independently, voiding. Infant bottling 15 ml of formula every 2-3 hours, tolerating well. Voiding and stooling. Will continue to monitor.     CHELITA Cabello RN

## 2023-09-15 NOTE — DISCHARGE SUMMARY
OB Discharge Summary      Date:  9/15/2023    Name:  Elisabet Logan  :  2000  MRN:  [id number]      Admission Date:  2023  Delivery Date: 2023   Gestational Age at Delivery:  38w3d  Discharge Date:  9/15/2023    Principal Diagnosis:    Patient Active Problem List   Diagnosis    Encounter for triage in pregnant patient    Encounter for induction of labor    Gestational hypertension         Conditions complicating Pregnancy:   1.BMI 31  2. GBSuria  3.gHTN    Procedure(s) Performed:  Cervidil for cervical ripening, Epidural, , repair of 1st degree laceration and deep right labial laceration    Indication for :  None  Indication for Induction:  gestational hypertension     Condition at Discharge:  Good    Discharge Medications:      Review of your medicines        START taking        Dose / Directions   acetaminophen 325 MG tablet  Commonly known as: TYLENOL  Used for:  (normal spontaneous vaginal delivery)      Dose: 975 mg  Take 3 tablets (975 mg) by mouth every 6 hours as needed for mild pain  Quantity: 50 tablet  Refills: 0     docusate sodium 100 MG capsule  Commonly known as: COLACE  Used for:  (normal spontaneous vaginal delivery)      Dose: 100 mg  Take 1 capsule (100 mg) by mouth daily as needed for constipation  Quantity: 60 capsule  Refills: 0     ibuprofen 800 MG tablet  Commonly known as: ADVIL/MOTRIN  Used for:  (normal spontaneous vaginal delivery)      Dose: 800 mg  Take 1 tablet (800 mg) by mouth every 8 hours as needed for other (cramping)  Quantity: 30 tablet  Refills: 0            CONTINUE these medicines which have NOT CHANGED        Dose / Directions   prenatal multivitamin  plus iron 27-1 MG Tabs      Take by mouth daily  Refills: 0            STOP taking      phenazopyridine 200 MG tablet  Commonly known as: PYRIDIUM                  Where to get your medicines        These medications were sent to [facility] [place]  [place], [place] - [address]  [address] [place] [address]      Phone: [phone]   acetaminophen 325 MG tablet  docusate sodium 100 MG capsule  ibuprofen 800 MG tablet          Discharge Plan:    Follow up with /MARCELLM:  [facility] [place] [facility] in 3-5 days for a blood pressure check and in 6 weeks for a postpartum visit   Patient Instructions:      Physical activity: As tolerated. Nothing in the vagina for 6 weeks.     Diet:  Regular.     Medication: As listed above. May alternate ibuprofen and acetaminophen for pain management.     Physician/CNM: TACOS Veloz CNM    Name:  Elisabet Logan  :  2000  MRN:  [id number]

## 2023-10-11 ENCOUNTER — MEDICAL CORRESPONDENCE (OUTPATIENT)
Dept: HEALTH INFORMATION MANAGEMENT | Facility: CLINIC | Age: 23
End: 2023-10-11
Payer: COMMERCIAL

## 2023-11-13 ENCOUNTER — MEDICAL CORRESPONDENCE (OUTPATIENT)
Dept: HEALTH INFORMATION MANAGEMENT | Facility: CLINIC | Age: 23
End: 2023-11-13
Payer: COMMERCIAL

## 2023-12-27 ENCOUNTER — MEDICAL CORRESPONDENCE (OUTPATIENT)
Dept: HEALTH INFORMATION MANAGEMENT | Facility: CLINIC | Age: 23
End: 2023-12-27

## 2024-01-23 ENCOUNTER — MEDICAL CORRESPONDENCE (OUTPATIENT)
Dept: HEALTH INFORMATION MANAGEMENT | Facility: CLINIC | Age: 24
End: 2024-01-23
Payer: COMMERCIAL

## 2024-05-28 ENCOUNTER — OFFICE VISIT (OUTPATIENT)
Dept: URGENT CARE | Facility: URGENT CARE | Age: 24
End: 2024-05-28
Payer: COMMERCIAL

## 2024-05-28 VITALS
WEIGHT: 176.8 LBS | RESPIRATION RATE: 18 BRPM | DIASTOLIC BLOOD PRESSURE: 95 MMHG | TEMPERATURE: 97.9 F | SYSTOLIC BLOOD PRESSURE: 146 MMHG | OXYGEN SATURATION: 98 % | HEART RATE: 72 BPM | BODY MASS INDEX: 32.34 KG/M2

## 2024-05-28 DIAGNOSIS — M67.431 GANGLION CYST OF WRIST, RIGHT: Primary | ICD-10-CM

## 2024-05-28 PROCEDURE — 99203 OFFICE O/P NEW LOW 30 MIN: CPT

## 2024-05-28 ASSESSMENT — PAIN SCALES - GENERAL: PAINLEVEL: SEVERE PAIN (6)

## 2024-05-28 NOTE — PROGRESS NOTES
URGENT CARE  Assessment & Plan   Assessment:   Elisabet Logan is a 24 year old female who's clinical presentation today is consistent with:   1. Ganglion cyst of wrist, right  - Orthopedic  Referral; Future  - Wrist/Arm/Hand Bracking Supplies Order Wrist Brace; Right; non-thumb spica  Plan:  I suspect a ganglion cyst today, given the cyst does not appear to have neurovascular involvement or compromise will treat supportively and symptomatically at this time, which will include: activity modifications, compression wraps, wrist support w/ a temporary splint and NSAIDs/tylenol as needed.   Educated patient that these cysts are usually harmless and will disappear on their own over time without intervention, however if they become painful or if removal is desired we will refer her  to hand speciality for further evaluation and treatment   Educated patient to monitor for any increased or developing numbness or tingling, skin or cyst changes or worsening stiffness, Additionally we discussed if symptoms do not improve after starting today's treatment to follow up in 5-7 days, sooner if symptoms worsen, return precautions given  No alarm signs or symptoms present   Differential Diagnoses for this patient's chief complaint that I considered include:  carpal tunnel syndrome, lipoma, Tenosynovitis, osteoarthritis, soft tissue sarcoma/malignancy/tumor, nerve aneurysm     Patient is} agreeable to treatment plan and state they will follow-up if symptoms do not improve and/or if symptoms worsen   see patient's AVS 'monitor for' section for specific patient instructions given and discussed regarding what to watch for and when to follow up    TACOS Sandra [facility]      ______________________________________________________________________      Subjective     HPI: Elisabet Logan  is a 24 year old  female who presents today for evaluation the following concerns:   Patient presents  today endorsing she has a lump on the top aspect of her right wrist. Patient states it is a firm, fluid-filled lump that is pea sized. Patient states  she noticed it today.    Patient endorses pain with palpation and notes pressure at the site with movement   Patient denies any numbness or tingling in the affected extremity   Patient denies any ROM changes, or skin changes.   Patient denies any redness, red streaks, fevers or warmth   Patient denies having a cyst like this in the past.     Review of Systems:  Pertinent review of systems as reflected in HPI, otherwise negative.     Objective    Physical Exam:  Vitals:    05/28/24 1637   BP: (!) 146/95   BP Location: Left arm   Patient Position: Sitting   Cuff Size: Adult Regular   Pulse: 72   Resp: 18   Temp: 97.9  F (36.6  C)   TempSrc: Oral   SpO2: 98%   Weight: 80.2 kg (176 lb 12.8 oz)      General:   alert and oriented, no acute distress, non ill-appearing   Vital signs reviewed: afebrile   Psy/mental status: pleasant  SKIN: intact   Msk:   right wrist/hand:   A roughly 4 mm semi-firm, fluid filled nodule is located on the dorsal (midline) aspect of the patient's wrist   Cyst is flesh colored, w/ no signs of irritation or cellulitis-type infection.    No erythema, ecchymosis, bruising, or other inflammation present   increased tenderness/pain with palpation to the cyst   No decreased range of motion with dorsalflexion, palmarflexion, ulnar deviation, or radial deviation.    pulse +2, skin intact, and no laceration present.  +4; neurovascularity intact distally including the thumb.   Temperature equal to body temperature  ______________________________________________________________________    I explained my diagnostic considerations and recommendations to the patient, who voiced understanding and agreement with the treatment plan.   All questions were answered.   We discussed potential side effects, risks and benefits of any prescribed or recommended  therapies, as well as expectations for response to treatments.  Please see AVS for any patient instructions & handouts given.   Patient was advised to contact the Nurse Care Line, their Primary Care provider, Urgent Care, or the Emergency Department if there are new or worsening symptoms, or call 911 for emergencies.

## 2024-05-28 NOTE — PATIENT INSTRUCTIONS
Diagnosis: ganglion cyst   Plan:   Rest, avoid over use and exertion of that extremity   Can try compression or a wrist splint   Ice and ibuprofen   Usually go away on their own over time without any intervention   If become painful d/t pressure or numbness noted (or removal is desired) - can refer to hand surgeon for drainage of cyst   Monitor for:   Increased pain or pressure   Numbness or tingling   Inability to move or use hand/wrist or fingers   Redness or infection of the cyst         Ganglion Cyst: Hand  A ganglion cyst is a firm, fluid-filled lump that can suddenly appear on the front or back of the wrist or at the base of a finger.   They are the most common type of mass or lump on the hand.   These cysts grow from normal tissue in the wrist and fingers.   They range in size from a pea to a peach pit.   Ganglion cysts are common and they don't become cancer.   They can occur after an injury or frequent overuse.   Ganglion cysts can change in size, and may go away on their own.   -usually is a painless bump on the wrist finger or hand and it is filled with joint fluid.   The cause of a ganglion cyst is not known.   If the cyst puts pressure on a nearby nerve it may cause pain. Otherwise, cysts are painless and harmless.   Most tend to disappear over time without treatment. Don't try to drain or break the cyst at home.

## 2024-05-31 NOTE — PROGRESS NOTES
SUBJECTIVE:   Elisabet Logan is a 24 year old female who is seen in consultation at the request of Urgent care for evaluation of a right wrist mass that has been present for approximately 1 week . The mass is located on the dorsal side of the wrist.   Size changes: none.  Very small.     HPI: lump on the top aspect of her right wrist. Patient states it is a firm, fluid-filled lump that is pea sized. Patient states  she noticed it on 5/28/24.    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 keyboarding   Treatments tried to this point: none    Review of Systems:  Constitutional:  NEGATIVE for fever, chills, change in weight  Integumentary/Skin:  NEGATIVE for worrisome rashes, moles or lesions  Eyes:  NEGATIVE for vision changes or irritation  ENT/Mouth:  NEGATIVE for ear, mouth and throat problems  Resp:  NEGATIVE for significant cough or SOB  Breast:  NEGATIVE for masses, tenderness or discharge  CV:  NEGATIVE for chest pain, palpitations or peripheral edema  GI:  NEGATIVE for nausea, abdominal pain, heartburn, or change in bowel habits  :  Negative   Musculoskeletal:  See HPI above  Neuro:  NEGATIVE for weakness, dizziness or paresthesias  Endocrine:  NEGATIVE for temperature intolerance, skin/hair changes  Heme/allergy/immune:  NEGATIVE for bleeding problems  Psychiatric:  NEGATIVE for changes in mood or affect    Past Medical History: No past medical history on file.  Past Surgical History: No past surgical history on file.  Family History: No family history on file.  Social History:   Social History     Tobacco Use    Smoking status: Never    Smokeless tobacco: Never   Substance Use Topics    Alcohol use: Not on file     OBJECTIVE:  Physical Exam:  There were no vitals taken for this visit.  General Appearance: healthy, alert and no distress   Skin: no suspicious lesions or rashes  Neuro: Normal strength and tone, mentation intact and speech normal  Vascular: good pulses, and cappillary refill   Lymph: no  lymphadenopathy   Psych:  mentation appears normal and affect normal/bright  Resp: no increased work of breathing     Right Wrist Exam:  Inspection: There is a mass located on the dorsal side of the right  wrist that is  Size: 1-2mm in diameter  Palpation: non-tender, somewhat mobile, firm    ROM: of wrist full, non painful     X-rays:  none     ASSESSMENT:   Tiny dorsal wrist ganglion vs a clotted superficial vein    PLAN:   Observation  Brace if needed  Voltaren gel as needed   Massage may help  Discussed the nature of ganglion cysts    Return to clinic: as needed     MAMI Santos MD  Dept. Orthopedic Surgery  [facility]

## 2024-06-03 ENCOUNTER — OFFICE VISIT (OUTPATIENT)
Dept: ORTHOPEDICS | Facility: CLINIC | Age: 24
End: 2024-06-03
Payer: COMMERCIAL

## 2024-06-03 VITALS — HEART RATE: 61 BPM | DIASTOLIC BLOOD PRESSURE: 81 MMHG | OXYGEN SATURATION: 96 % | SYSTOLIC BLOOD PRESSURE: 127 MMHG

## 2024-06-03 DIAGNOSIS — M25.831 MASS OF JOINT OF RIGHT WRIST: Primary | ICD-10-CM

## 2024-06-03 DIAGNOSIS — M67.431 GANGLION CYST OF WRIST, RIGHT: ICD-10-CM

## 2024-06-03 PROCEDURE — 99242 OFF/OP CONSLTJ NEW/EST SF 20: CPT | Performed by: ORTHOPAEDIC SURGERY

## 2024-06-03 ASSESSMENT — PAIN SCALES - GENERAL: PAINLEVEL: MILD PAIN (2)

## 2024-06-03 NOTE — LETTER
6/3/2024         RE: Elisabet Logan  [address] [facility] [address]        Dear Colleague,    Thank you for referring your patient, Elisabet Logan, to the [facility] [place]. Please see a copy of my visit note below.    SUBJECTIVE:   Elisabet Logan is a 24 year old female who is seen in consultation at the request of Urgent care for evaluation of a right wrist mass that has been present for approximately 1 week . The mass is located on the dorsal side of the wrist.   Size changes: none.  Very small.     HPI: lump on the top aspect of her right wrist. Patient states it is a firm, fluid-filled lump that is pea sized. Patient states  she noticed it on 5/28/24.    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 keyboarding   Treatments tried to this point: none    Review of Systems:  Constitutional:  NEGATIVE for fever, chills, change in weight  Integumentary/Skin:  NEGATIVE for worrisome rashes, moles or lesions  Eyes:  NEGATIVE for vision changes or irritation  ENT/Mouth:  NEGATIVE for ear, mouth and throat problems  Resp:  NEGATIVE for significant cough or SOB  Breast:  NEGATIVE for masses, tenderness or discharge  CV:  NEGATIVE for chest pain, palpitations or peripheral edema  GI:  NEGATIVE for nausea, abdominal pain, heartburn, or change in bowel habits  :  Negative   Musculoskeletal:  See HPI above  Neuro:  NEGATIVE for weakness, dizziness or paresthesias  Endocrine:  NEGATIVE for temperature intolerance, skin/hair changes  Heme/allergy/immune:  NEGATIVE for bleeding problems  Psychiatric:  NEGATIVE for changes in mood or affect    Past Medical History: No past medical history on file.  Past Surgical History: No past surgical history on file.  Family History: No family history on file.  Social History:   Social History     Tobacco Use     Smoking status: Never     Smokeless tobacco: Never   Substance Use Topics     Alcohol use: Not on file     OBJECTIVE:  Physical Exam:  There were no  vitals taken for this visit.  General Appearance: healthy, alert and no distress   Skin: no suspicious lesions or rashes  Neuro: Normal strength and tone, mentation intact and speech normal  Vascular: good pulses, and cappillary refill   Lymph: no lymphadenopathy   Psych:  mentation appears normal and affect normal/bright  Resp: no increased work of breathing     Right Wrist Exam:  Inspection: There is a mass located on the dorsal side of the right  wrist that is  Size: 1-2mm in diameter  Palpation: non-tender, somewhat mobile, firm    ROM: of wrist full, non painful     X-rays:  none     ASSESSMENT:   Tiny dorsal wrist ganglion vs a clotted superficial vein    PLAN:   Observation  Brace if needed  Voltaren gel as needed   Massage may help  Discussed the nature of ganglion cysts    Return to clinic: as needed     MAMI Santos MD  Dept. Orthopedic Surgery  [facility]            Again, thank you for allowing me to participate in the care of your patient.        Sincerely,        Guzman Santos MD

## 2024-07-18 LAB
HEPATITIS B SURFACE ANTIGEN (EXTERNAL): NEGATIVE
HIV1+2 AB SERPL QL IA: NEGATIVE
RUBELLA ANTIBODY IGG (EXTERNAL): NORMAL

## 2024-09-29 ENCOUNTER — HEALTH MAINTENANCE LETTER (OUTPATIENT)
Age: 24
End: 2024-09-29

## 2024-12-03 ENCOUNTER — OFFICE VISIT (OUTPATIENT)
Dept: URGENT CARE | Facility: URGENT CARE | Age: 24
End: 2024-12-03
Payer: COMMERCIAL

## 2024-12-03 VITALS
TEMPERATURE: 97 F | BODY MASS INDEX: 33.11 KG/M2 | DIASTOLIC BLOOD PRESSURE: 84 MMHG | OXYGEN SATURATION: 98 % | SYSTOLIC BLOOD PRESSURE: 143 MMHG | HEART RATE: 84 BPM | RESPIRATION RATE: 16 BRPM | WEIGHT: 181 LBS

## 2024-12-03 DIAGNOSIS — M89.8X1 PAIN OF LEFT SCAPULA: Primary | ICD-10-CM

## 2024-12-03 RX ORDER — ASPIRIN 81 MG
TABLET,CHEWABLE ORAL
COMMUNITY
Start: 2024-11-25

## 2024-12-03 ASSESSMENT — PAIN SCALES - GENERAL: PAINLEVEL_OUTOF10: SEVERE PAIN (7)

## 2024-12-03 NOTE — PATIENT INSTRUCTIONS
EKG in the clinic does not show a heart attack or irregular rhythm.  Use Tylenol as needed for the pain with the maximum dose being 4000 mg in a 24-hour period of time.  Use ice/heat and/or topical medication such as IcyHot to the area for pain.  Follow-up with your OB provider should symptoms persist.

## 2024-12-03 NOTE — PROGRESS NOTES
Assessment & Plan   (M89.8X1) Pain of left scapula  (primary encounter diagnosis)  Plan: EKG 12-lead complete w/read - Clinics    EKG performed in the clinic out of abundance of caution for the left upper back pain near the scapula.  Informed the patient of the EKG in the clinic does not show a heart attack or irregular rhythm in the EKG performed today in the clinic is comparable to the one that the patient had approximately 2 years ago.  We discussed using Tylenol as needed for pain with the maximum dose being 4000 mg in a 24-hour period of time.  We also discussed using ice/heat and/or topical medication such as IcyHot to the area for pain.  Instructed the patient to follow-up with her OB provider should symptoms persist.  Patient acknowledged her understanding of the above plan.    The use of [employer]ation services may have been used to construct the content in this note; any grammatical or spelling errors are non-intentional. Please contact the author of this note directly if you are in need of any clarification.      TACOS Herrera [facility]    Sahra Bhandari is a 24 year old female who presents to clinic today for the following health issues:  Chief Complaint   Patient presents with    Shoulder Pain     Pain between left scapula and left shoulder. Patient states area is tender to touch. Pain increasing since yesterday. No known injury.     HPI  Patient reports pain near the left scapula starting yesterday that is tender to touch.  She rates the pain as 7/10 with achy and sharp shooting components.  She  is concerned it may be related to preeclampsia as she has a history of preeclampsia with one of her previous pregnancies.  She indicates she had her brother use an ice rolling mechanism over the area for treatment.  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    ROS:  Negative except noted above.    Review of  Systems        Objective    BP (!) 143/84 (BP Location: Left arm, Patient Position: Sitting, Cuff Size: Adult Regular)   Pulse 84   Temp 97  F (36.1  C) (Tympanic)   Resp 16   Wt 82.1 kg (181 lb)   SpO2 98%   Breastfeeding No   BMI 33.11 kg/m    Physical Exam   GENERAL: alert and no distress  EYES: Eyes grossly normal to inspection, PERRL and conjunctivae and sclerae normal  MS: Tender upon palpation over the upper back near the scapula.  Full ROM.  SKIN: Pinkish patches over the left sided upper back near the scapula that favor skin irritation from rubbing the ice rolling mechanism over the area yesterday.  No macular, papular or vesicular components to the patches.  NEURO: Normal strength and tone, mentation intact and speech normal

## 2025-01-23 LAB — GROUP B STREPTOCOCCUS (EXTERNAL): POSITIVE

## 2025-02-12 ENCOUNTER — HOSPITAL ENCOUNTER (OUTPATIENT)
Facility: CLINIC | Age: 25
Discharge: HOME | End: 2025-02-12
Payer: COMMERCIAL

## 2025-02-12 VITALS — SYSTOLIC BLOOD PRESSURE: 128 MMHG | RESPIRATION RATE: 20 BRPM | TEMPERATURE: 98.3 F | DIASTOLIC BLOOD PRESSURE: 82 MMHG

## 2025-02-12 PROCEDURE — G0463 HOSPITAL OUTPT CLINIC VISIT: HCPCS

## 2025-02-12 ASSESSMENT — ACTIVITIES OF DAILY LIVING (ADL)
ADLS_ACUITY_SCORE: 24
ADLS_ACUITY_SCORE: 24
ADLS_ACUITY_SCORE: 50

## 2025-02-12 NOTE — CARE PLAN
Data: Patient presented to [facility]: 2025  1:01 PM.  Reason for maternal/fetal assessment is abdominal pain. Patient reports low abd pain that wraps around to her back Monday at midnight and then subsided around 0900am, that same pain started this morning and she decided to come in to the hospital to get checked out. Patient denies abdominal pain. Patient reports fetal movement is normal. Patient is a 39w0d . Prenatal record reviewed. Pregnancy has been uncomplicated. Support person is present.     Fetal HR baseline was  , variability is  . Accelerations:  . Decelerations:  . Uterine assessment is   during contractions and   at rest. Cervical exam deferred. Fetal presentation   per [place] and pt stated prenatal history . Membranes: intact.    Vital signs wnl. Patient reports pain and is coping.     Action: Verbal consent for EFM. Triage assessment completed. Patient may meet criteria for early labor discharge.     Response: Patient verbalized understanding of triage assessment. Will contact Lurdes Ledbetter CNM with assessment and consideration of early labor discharge vs admission.

## 2025-02-12 NOTE — CARE PLAN
Data: Patient assessed in the [facility] for  low abd pain . Cervix 3.5 cm dilated and 50-60% effaced. Fetal station -1. Membranes intact. Contractions are  , x1 minutes apart, and last 70 seconds. Uterine assessment is   during contractions and   at rest. See flowsheets for fetal assessment documentation.     Action:  Presumed adequate fetal oxygenation documented. Early labor precautions and discharge instructions reviewed, including when to notify provider if warning signs present. Patient instructed to report decrease in fetal movement, vaginal bleeding, changes in membrane status, abdominal pain, or any concerns related to the pregnancy to patient's provider/clinic.     Response: Orders to discharge home per Lurdes Ledbetter CNM. Plan for patient is to re-evaluate labor status by following up with provider as scheduled in clinic. Patient verbalized understanding of education and agreement with plan. Discharged to home at 1430.

## 2025-02-12 NOTE — PROGRESS NOTES
Subjective:    Elisabet Logan is a 24 year old  at 39w0d who presents to [facility] for evaluation of low abdominal cramping with radiation into her lower back. Initially started Monday night x several hours then subsided, resumed this AM.     Objective:    /82 (BP Location: Right arm, Patient Position: Semi-Olson's, Cuff Size: Adult Regular)   Temp 98.3  F (36.8  C) (Oral)   Resp 20     FHR: Reactive NST- baseline 130, moderate variability, + accels, no decels  Contractions: uterine irritability, irregular mild ctx q 2-8 min    SVE per RN: 3.5/50/-1, posterior and deviated to maternal left.   Intact membranes    Assessment:  25 yo  at 39w0d  Pre-labor contractions vs. Early labor      Plan:  Offered discharge home vs staying with plan to re-evaluate cervix in 2 hours. Patient desires discharge home.     TACOS Chow CNM  25 1:45 PM

## 2025-02-12 NOTE — DISCHARGE INSTRUCTIONS
Learning About When to Call Your Doctor During Pregnancy (After 20 Weeks)  Overview  It's common to have concerns about what might be a problem when you're pregnant. Most pregnancies don't have any serious problems. But it's still important to know when to call your doctor if you have certain symptoms or signs of labor.  These are general suggestions. Your doctor may give you some more information about when to call.  When to call your doctor (after 20 weeks)  Call 911  anytime you think you may need emergency care. For example, call if:  You have severe vaginal bleeding. This means you are soaking through a pad each hour for 2 or more hours.  You have sudden, severe pain in your belly.  You have chest pain, are short of breath, or cough up blood.  You passed out (lost consciousness).  You have a seizure.  You see or feel the umbilical cord.  You think you are about to deliver your baby and can't make it safely to the hospital or birthing center.  Call your doctor now or seek immediate medical care if:  You have vaginal bleeding.  You have belly pain.  You have a fever.  You are dizzy or lightheaded, or you feel like you may faint.  You have signs of a blood clot in your leg (called a deep vein thrombosis), such as:  Pain in the calf, back of the knee, thigh, or groin.  Swelling in your leg or groin.  A color change on the leg or groin. The skin may be reddish or purplish, depending on your usual skin color.  You have symptoms of preeclampsia, such as:  Sudden swelling of your face, hands, or feet.  New vision problems (such as dimness, blurring, or seeing spots).  A severe headache.  You have a sudden release of fluid from your vagina. (You think your water broke.)  You've been having regular contractions for an hour. This means that you've had at least 6 contractions within 1 hour, even after you change your position and drink fluids.  You notice that your baby has stopped moving or is moving less than  "normal.  You have signs of heart failure, such as:  New or increased shortness of breath.  New or worse swelling in your legs, ankles, or feet.  Sudden weight gain, such as more than 2 to 3 pounds in a day or 5 pounds in a week.  Feeling so tired or weak that you cannot do your usual activities.  You have symptoms of a urinary tract infection. These may include:  Pain or burning when you urinate.  A frequent need to urinate without being able to pass much urine.  Pain in the flank, which is just below the rib cage and above the waist on either side of the back.  Blood in your urine.  Watch closely for changes in your health, and be sure to contact your doctor if:  You have vaginal discharge that smells bad.  You feel sad, anxious, or hopeless for more than a few days.  You have skin changes, such as a rash, itching, or a yellow color to your skin.  You have other concerns about your pregnancy.  If you have labor signs at 37 weeks or more  If you have signs of labor at 37 weeks or more, your doctor may tell you to call when your labor becomes more active. Symptoms of active labor include:  Contractions that are regular.  Contractions that are less than 5 minutes apart.  Contractions that are hard to talk through.  Follow-up care is a key part of your treatment and safety. Be sure to make and go to all appointments, and call your doctor if you are having problems. It's also a good idea to know your test results and keep a list of the medicines you take.  Where can you learn more?  Go to https://www.[employer].net/patiented  Enter N531 in the search box to learn more about \"Learning About When to Call Your Doctor During Pregnancy (After 20 Weeks).\"  Current as of: April 30, 2024  Content Version: 14.3    [address]Â LibraryÂ NetworkFire[facility] [place].   Care instructions adapted under license by your healthcare professional. If you have questions about a medical condition or this instruction, always ask your healthcare professional. " [employer], [facility] disclaims any warranty or liability for your use of this information.

## 2025-02-14 ENCOUNTER — HOSPITAL ENCOUNTER (INPATIENT)
Facility: CLINIC | Age: 25
LOS: 1 days | Discharge: HOME | End: 2025-02-16
Attending: REGISTERED NURSE | Admitting: REGISTERED NURSE
Payer: COMMERCIAL

## 2025-02-15 ENCOUNTER — ANESTHESIA EVENT (OUTPATIENT)
Dept: OBGYN | Facility: CLINIC | Age: 25
End: 2025-02-15
Payer: COMMERCIAL

## 2025-02-15 ENCOUNTER — ANESTHESIA (OUTPATIENT)
Dept: OBGYN | Facility: CLINIC | Age: 25
End: 2025-02-15
Payer: COMMERCIAL

## 2025-02-15 PROBLEM — Z34.90 TERM PREGNANCY: Status: ACTIVE | Noted: 2025-02-15

## 2025-02-15 LAB
ABO + RH BLD: NORMAL
ALBUMIN MFR UR ELPH: 16.6 MG/DL
ALBUMIN SERPL BCG-MCNC: 3.6 G/DL (ref 3.5–5.2)
ALP SERPL-CCNC: 226 U/L (ref 40–150)
ALT SERPL W P-5'-P-CCNC: 7 U/L (ref 0–50)
ANION GAP SERPL CALCULATED.3IONS-SCNC: 12 MMOL/L (ref 7–15)
AST SERPL W P-5'-P-CCNC: 16 U/L (ref 0–45)
BILIRUB SERPL-MCNC: 0.2 MG/DL
BLD GP AB SCN SERPL QL: NEGATIVE
BUN SERPL-MCNC: 9.3 MG/DL (ref 6–20)
CALCIUM SERPL-MCNC: 9.7 MG/DL (ref 8.8–10.4)
CHLORIDE SERPL-SCNC: 105 MMOL/L (ref 98–107)
CREAT SERPL-MCNC: 0.79 MG/DL (ref 0.51–0.95)
CREAT UR-MCNC: 76.5 MG/DL
EGFRCR SERPLBLD CKD-EPI 2021: >90 ML/MIN/1.73M2
ERYTHROCYTE [DISTWIDTH] IN BLOOD BY AUTOMATED COUNT: 13.3 % (ref 10–15)
GLUCOSE SERPL-MCNC: 85 MG/DL (ref 70–99)
HCO3 SERPL-SCNC: 20 MMOL/L (ref 22–29)
HCT VFR BLD AUTO: 32.1 % (ref 35–47)
HGB BLD-MCNC: 10.8 G/DL (ref 11.7–15.7)
MCH RBC QN AUTO: 27.7 PG (ref 26.5–33)
MCHC RBC AUTO-ENTMCNC: 33.6 G/DL (ref 31.5–36.5)
MCV RBC AUTO: 82 FL (ref 78–100)
PLATELET # BLD AUTO: 204 10E3/UL (ref 150–450)
POTASSIUM SERPL-SCNC: 4.1 MMOL/L (ref 3.4–5.3)
PROT SERPL-MCNC: 6 G/DL (ref 6.4–8.3)
PROT/CREAT 24H UR: 0.22 MG/MG CR (ref 0–0.2)
RBC # BLD AUTO: 3.9 10E6/UL (ref 3.8–5.2)
SODIUM SERPL-SCNC: 137 MMOL/L (ref 135–145)
SPECIMEN EXP DATE BLD: NORMAL
T PALLIDUM AB SER QL: NONREACTIVE
WBC # BLD AUTO: 9.5 10E3/UL (ref 4–11)

## 2025-02-15 PROCEDURE — 250N000011 HC RX IP 250 OP 636: Performed by: STUDENT IN AN ORGANIZED HEALTH CARE EDUCATION/TRAINING PROGRAM

## 2025-02-15 PROCEDURE — 86850 RBC ANTIBODY SCREEN: CPT | Performed by: REGISTERED NURSE

## 2025-02-15 PROCEDURE — 722N000001 HC LABOR CARE VAGINAL DELIVERY SINGLE

## 2025-02-15 PROCEDURE — 3E0R3BZ INTRODUCTION OF ANESTHETIC AGENT INTO SPINAL CANAL, PERCUTANEOUS APPROACH: ICD-10-PCS | Performed by: STUDENT IN AN ORGANIZED HEALTH CARE EDUCATION/TRAINING PROGRAM

## 2025-02-15 PROCEDURE — 120N000001 HC R&B MED SURG/OB

## 2025-02-15 PROCEDURE — 86780 TREPONEMA PALLIDUM: CPT | Performed by: REGISTERED NURSE

## 2025-02-15 PROCEDURE — 86900 BLOOD TYPING SEROLOGIC ABO: CPT | Performed by: REGISTERED NURSE

## 2025-02-15 PROCEDURE — 84156 ASSAY OF PROTEIN URINE: CPT | Performed by: REGISTERED NURSE

## 2025-02-15 PROCEDURE — 250N000011 HC RX IP 250 OP 636: Performed by: REGISTERED NURSE

## 2025-02-15 PROCEDURE — 00HU33Z INSERTION OF INFUSION DEVICE INTO SPINAL CANAL, PERCUTANEOUS APPROACH: ICD-10-PCS | Performed by: STUDENT IN AN ORGANIZED HEALTH CARE EDUCATION/TRAINING PROGRAM

## 2025-02-15 PROCEDURE — 82040 ASSAY OF SERUM ALBUMIN: CPT | Performed by: REGISTERED NURSE

## 2025-02-15 PROCEDURE — 80048 BASIC METABOLIC PNL TOTAL CA: CPT | Performed by: REGISTERED NURSE

## 2025-02-15 PROCEDURE — 36415 COLL VENOUS BLD VENIPUNCTURE: CPT | Performed by: REGISTERED NURSE

## 2025-02-15 PROCEDURE — 370N000003 HC ANESTHESIA WARD SERVICE: Performed by: STUDENT IN AN ORGANIZED HEALTH CARE EDUCATION/TRAINING PROGRAM

## 2025-02-15 PROCEDURE — 85014 HEMATOCRIT: CPT | Performed by: REGISTERED NURSE

## 2025-02-15 PROCEDURE — 250N000013 HC RX MED GY IP 250 OP 250 PS 637: Performed by: REGISTERED NURSE

## 2025-02-15 PROCEDURE — 82435 ASSAY OF BLOOD CHLORIDE: CPT | Performed by: REGISTERED NURSE

## 2025-02-15 RX ORDER — BISACODYL 10 MG
10 SUPPOSITORY, RECTAL RECTAL DAILY PRN
Status: DISCONTINUED | OUTPATIENT
Start: 2025-02-15 | End: 2025-02-16 | Stop reason: HOSPADM

## 2025-02-15 RX ORDER — ONDANSETRON 2 MG/ML
4 INJECTION INTRAMUSCULAR; INTRAVENOUS EVERY 6 HOURS PRN
Status: DISCONTINUED | OUTPATIENT
Start: 2025-02-15 | End: 2025-02-15 | Stop reason: HOSPADM

## 2025-02-15 RX ORDER — ONDANSETRON 4 MG/1
4 TABLET, ORALLY DISINTEGRATING ORAL EVERY 6 HOURS PRN
Status: DISCONTINUED | OUTPATIENT
Start: 2025-02-15 | End: 2025-02-15 | Stop reason: HOSPADM

## 2025-02-15 RX ORDER — NALOXONE HYDROCHLORIDE 0.4 MG/ML
0.2 INJECTION, SOLUTION INTRAMUSCULAR; INTRAVENOUS; SUBCUTANEOUS
Status: DISCONTINUED | OUTPATIENT
Start: 2025-02-15 | End: 2025-02-16 | Stop reason: HOSPADM

## 2025-02-15 RX ORDER — TRANEXAMIC ACID 10 MG/ML
1 INJECTION, SOLUTION INTRAVENOUS EVERY 30 MIN PRN
Status: DISCONTINUED | OUTPATIENT
Start: 2025-02-15 | End: 2025-02-16 | Stop reason: HOSPADM

## 2025-02-15 RX ORDER — OXYTOCIN 10 [USP'U]/ML
10 INJECTION, SOLUTION INTRAMUSCULAR; INTRAVENOUS
Status: DISCONTINUED | OUTPATIENT
Start: 2025-02-15 | End: 2025-02-15 | Stop reason: HOSPADM

## 2025-02-15 RX ORDER — OXYTOCIN/0.9 % SODIUM CHLORIDE 30/500 ML
100-340 PLASTIC BAG, INJECTION (ML) INTRAVENOUS CONTINUOUS PRN
Status: DISCONTINUED | OUTPATIENT
Start: 2025-02-15 | End: 2025-02-16 | Stop reason: HOSPADM

## 2025-02-15 RX ORDER — PENICILLIN G 3000000 [IU]/50ML
3 INJECTION, SOLUTION INTRAVENOUS EVERY 4 HOURS
Status: DISCONTINUED | OUTPATIENT
Start: 2025-02-15 | End: 2025-02-15 | Stop reason: HOSPADM

## 2025-02-15 RX ORDER — LOPERAMIDE HYDROCHLORIDE 2 MG/1
4 CAPSULE ORAL
Status: DISCONTINUED | OUTPATIENT
Start: 2025-02-15 | End: 2025-02-16 | Stop reason: HOSPADM

## 2025-02-15 RX ORDER — MISOPROSTOL 200 UG/1
800 TABLET ORAL
Status: DISCONTINUED | OUTPATIENT
Start: 2025-02-15 | End: 2025-02-16 | Stop reason: HOSPADM

## 2025-02-15 RX ORDER — NALOXONE HYDROCHLORIDE 0.4 MG/ML
0.4 INJECTION, SOLUTION INTRAMUSCULAR; INTRAVENOUS; SUBCUTANEOUS
Status: DISCONTINUED | OUTPATIENT
Start: 2025-02-15 | End: 2025-02-16 | Stop reason: HOSPADM

## 2025-02-15 RX ORDER — KETOROLAC TROMETHAMINE 15 MG/ML
15 INJECTION, SOLUTION INTRAMUSCULAR; INTRAVENOUS
Status: DISCONTINUED | OUTPATIENT
Start: 2025-02-15 | End: 2025-02-15 | Stop reason: HOSPADM

## 2025-02-15 RX ORDER — MISOPROSTOL 200 UG/1
400 TABLET ORAL
Status: DISCONTINUED | OUTPATIENT
Start: 2025-02-15 | End: 2025-02-16 | Stop reason: HOSPADM

## 2025-02-15 RX ORDER — METOCLOPRAMIDE HYDROCHLORIDE 5 MG/ML
10 INJECTION INTRAMUSCULAR; INTRAVENOUS EVERY 6 HOURS PRN
Status: DISCONTINUED | OUTPATIENT
Start: 2025-02-15 | End: 2025-02-15 | Stop reason: HOSPADM

## 2025-02-15 RX ORDER — MISOPROSTOL 200 UG/1
400 TABLET ORAL
Status: DISCONTINUED | OUTPATIENT
Start: 2025-02-15 | End: 2025-02-15 | Stop reason: HOSPADM

## 2025-02-15 RX ORDER — METHYLERGONOVINE MALEATE 0.2 MG/ML
200 INJECTION INTRAVENOUS
Status: DISCONTINUED | OUTPATIENT
Start: 2025-02-15 | End: 2025-02-16 | Stop reason: HOSPADM

## 2025-02-15 RX ORDER — FENTANYL/ROPIVACAINE/NS/PF 2MCG/ML-.1
PLASTIC BAG, INJECTION (ML) EPIDURAL
Status: DISCONTINUED | OUTPATIENT
Start: 2025-02-15 | End: 2025-02-15 | Stop reason: HOSPADM

## 2025-02-15 RX ORDER — CARBOPROST TROMETHAMINE 250 UG/ML
250 INJECTION, SOLUTION INTRAMUSCULAR
Status: DISCONTINUED | OUTPATIENT
Start: 2025-02-15 | End: 2025-02-16 | Stop reason: HOSPADM

## 2025-02-15 RX ORDER — CARBOPROST TROMETHAMINE 250 UG/ML
250 INJECTION, SOLUTION INTRAMUSCULAR
Status: DISCONTINUED | OUTPATIENT
Start: 2025-02-15 | End: 2025-02-15 | Stop reason: HOSPADM

## 2025-02-15 RX ORDER — LOPERAMIDE HYDROCHLORIDE 2 MG/1
2 CAPSULE ORAL
Status: DISCONTINUED | OUTPATIENT
Start: 2025-02-15 | End: 2025-02-15 | Stop reason: HOSPADM

## 2025-02-15 RX ORDER — LIDOCAINE HYDROCHLORIDE AND EPINEPHRINE 15; 5 MG/ML; UG/ML
INJECTION, SOLUTION EPIDURAL PRN
Status: DISCONTINUED | OUTPATIENT
Start: 2025-02-15 | End: 2025-02-15

## 2025-02-15 RX ORDER — LOPERAMIDE HYDROCHLORIDE 2 MG/1
2 CAPSULE ORAL
Status: DISCONTINUED | OUTPATIENT
Start: 2025-02-15 | End: 2025-02-16 | Stop reason: HOSPADM

## 2025-02-15 RX ORDER — CITRIC ACID/SODIUM CITRATE 334-500MG
30 SOLUTION, ORAL ORAL
Status: DISCONTINUED | OUTPATIENT
Start: 2025-02-15 | End: 2025-02-15 | Stop reason: HOSPADM

## 2025-02-15 RX ORDER — ACETAMINOPHEN 325 MG/1
650 TABLET ORAL EVERY 4 HOURS PRN
Status: DISCONTINUED | OUTPATIENT
Start: 2025-02-15 | End: 2025-02-16 | Stop reason: HOSPADM

## 2025-02-15 RX ORDER — PROCHLORPERAZINE MALEATE 10 MG
10 TABLET ORAL EVERY 6 HOURS PRN
Status: DISCONTINUED | OUTPATIENT
Start: 2025-02-15 | End: 2025-02-15 | Stop reason: HOSPADM

## 2025-02-15 RX ORDER — OXYTOCIN 10 [USP'U]/ML
10 INJECTION, SOLUTION INTRAMUSCULAR; INTRAVENOUS
Status: DISCONTINUED | OUTPATIENT
Start: 2025-02-15 | End: 2025-02-16 | Stop reason: HOSPADM

## 2025-02-15 RX ORDER — POLYETHYLENE GLYCOL 3350 17 G/17G
17 POWDER, FOR SOLUTION ORAL DAILY PRN
Status: DISCONTINUED | OUTPATIENT
Start: 2025-02-15 | End: 2025-02-16 | Stop reason: HOSPADM

## 2025-02-15 RX ORDER — PENICILLIN G POTASSIUM 5000000 [IU]/1
5 INJECTION, POWDER, FOR SOLUTION INTRAMUSCULAR; INTRAVENOUS ONCE
Status: COMPLETED | OUTPATIENT
Start: 2025-02-15 | End: 2025-02-15

## 2025-02-15 RX ORDER — METOCLOPRAMIDE 10 MG/1
10 TABLET ORAL EVERY 6 HOURS PRN
Status: DISCONTINUED | OUTPATIENT
Start: 2025-02-15 | End: 2025-02-15 | Stop reason: HOSPADM

## 2025-02-15 RX ORDER — OXYTOCIN/0.9 % SODIUM CHLORIDE 30/500 ML
340 PLASTIC BAG, INJECTION (ML) INTRAVENOUS CONTINUOUS PRN
Status: DISCONTINUED | OUTPATIENT
Start: 2025-02-15 | End: 2025-02-15 | Stop reason: HOSPADM

## 2025-02-15 RX ORDER — METHYLERGONOVINE MALEATE 0.2 MG/ML
200 INJECTION INTRAVENOUS
Status: DISCONTINUED | OUTPATIENT
Start: 2025-02-15 | End: 2025-02-15 | Stop reason: HOSPADM

## 2025-02-15 RX ORDER — IBUPROFEN 800 MG/1
800 TABLET, FILM COATED ORAL EVERY 6 HOURS PRN
Status: DISCONTINUED | OUTPATIENT
Start: 2025-02-15 | End: 2025-02-16 | Stop reason: HOSPADM

## 2025-02-15 RX ORDER — TRANEXAMIC ACID 10 MG/ML
1 INJECTION, SOLUTION INTRAVENOUS EVERY 30 MIN PRN
Status: DISCONTINUED | OUTPATIENT
Start: 2025-02-15 | End: 2025-02-15 | Stop reason: HOSPADM

## 2025-02-15 RX ORDER — IBUPROFEN 800 MG/1
800 TABLET, FILM COATED ORAL
Status: DISCONTINUED | OUTPATIENT
Start: 2025-02-15 | End: 2025-02-15 | Stop reason: HOSPADM

## 2025-02-15 RX ORDER — OXYTOCIN/0.9 % SODIUM CHLORIDE 30/500 ML
340 PLASTIC BAG, INJECTION (ML) INTRAVENOUS CONTINUOUS PRN
Status: DISCONTINUED | OUTPATIENT
Start: 2025-02-15 | End: 2025-02-16 | Stop reason: HOSPADM

## 2025-02-15 RX ORDER — FENTANYL CITRATE 50 UG/ML
100 INJECTION, SOLUTION INTRAMUSCULAR; INTRAVENOUS
Status: DISCONTINUED | OUTPATIENT
Start: 2025-02-15 | End: 2025-02-15 | Stop reason: HOSPADM

## 2025-02-15 RX ORDER — HYDROCORTISONE 25 MG/G
CREAM TOPICAL 3 TIMES DAILY PRN
Status: DISCONTINUED | OUTPATIENT
Start: 2025-02-15 | End: 2025-02-16 | Stop reason: HOSPADM

## 2025-02-15 RX ORDER — MISOPROSTOL 200 UG/1
800 TABLET ORAL
Status: DISCONTINUED | OUTPATIENT
Start: 2025-02-15 | End: 2025-02-15 | Stop reason: HOSPADM

## 2025-02-15 RX ORDER — OXYCODONE HYDROCHLORIDE 5 MG/1
5 TABLET ORAL EVERY 4 HOURS PRN
Status: DISCONTINUED | OUTPATIENT
Start: 2025-02-15 | End: 2025-02-16 | Stop reason: HOSPADM

## 2025-02-15 RX ORDER — LOPERAMIDE HYDROCHLORIDE 2 MG/1
4 CAPSULE ORAL
Status: DISCONTINUED | OUTPATIENT
Start: 2025-02-15 | End: 2025-02-15 | Stop reason: HOSPADM

## 2025-02-15 RX ORDER — AMOXICILLIN 250 MG
2 CAPSULE ORAL
Status: DISCONTINUED | OUTPATIENT
Start: 2025-02-15 | End: 2025-02-16 | Stop reason: HOSPADM

## 2025-02-15 RX ORDER — NALBUPHINE HYDROCHLORIDE 10 MG/ML
2.5-5 INJECTION INTRAMUSCULAR; INTRAVENOUS; SUBCUTANEOUS EVERY 6 HOURS PRN
Status: DISCONTINUED | OUTPATIENT
Start: 2025-02-15 | End: 2025-02-16 | Stop reason: HOSPADM

## 2025-02-15 RX ORDER — FENTANYL CITRATE-0.9 % NACL/PF 10 MCG/ML
100 PLASTIC BAG, INJECTION (ML) INTRAVENOUS EVERY 5 MIN PRN
Status: DISCONTINUED | OUTPATIENT
Start: 2025-02-15 | End: 2025-02-15 | Stop reason: HOSPADM

## 2025-02-15 RX ADMIN — IBUPROFEN 800 MG: 800 TABLET ORAL at 17:50

## 2025-02-15 RX ADMIN — Medication 6 ML: at 03:34

## 2025-02-15 RX ADMIN — Medication: at 03:28

## 2025-02-15 RX ADMIN — ACETAMINOPHEN 650 MG: 325 TABLET ORAL at 20:03

## 2025-02-15 RX ADMIN — PENICILLIN G POTASSIUM 5 MILLION UNITS: 5000000 POWDER, FOR SOLUTION INTRAMUSCULAR; INTRAPLEURAL; INTRATHECAL; INTRAVENOUS at 02:18

## 2025-02-15 RX ADMIN — LIDOCAINE HYDROCHLORIDE,EPINEPHRINE BITARTRATE 3 ML: 15; .005 INJECTION, SOLUTION EPIDURAL; INFILTRATION; INTRACAUDAL; PERINEURAL at 03:32

## 2025-02-15 RX ADMIN — KETOROLAC TROMETHAMINE 15 MG: 15 INJECTION, SOLUTION INTRAMUSCULAR; INTRAVENOUS at 07:29

## 2025-02-15 ASSESSMENT — ACTIVITIES OF DAILY LIVING (ADL)
ADLS_ACUITY_SCORE: 26
ADLS_ACUITY_SCORE: 25
ADLS_ACUITY_SCORE: 26
ADLS_ACUITY_SCORE: 25
ADLS_ACUITY_SCORE: 26
ADLS_ACUITY_SCORE: 25
ADLS_ACUITY_SCORE: 24
ADLS_ACUITY_SCORE: 26
ADLS_ACUITY_SCORE: 24
ADLS_ACUITY_SCORE: 26
ADLS_ACUITY_SCORE: 25
ADLS_ACUITY_SCORE: 25
ADLS_ACUITY_SCORE: 26
ADLS_ACUITY_SCORE: 25
ADLS_ACUITY_SCORE: 26
ADLS_ACUITY_SCORE: 24
ADLS_ACUITY_SCORE: 25

## 2025-02-15 NOTE — H&P
"HISTORY AND PHYSICAL UPDATE ADMISSION EXAM    Name: Elisabet Ball  YOB: 2000  Medical Record Number: [id number]    History of Present Illness: Elisabet Ball is a 24 year old female who is 39w3d pregnant and being admitted for labor management. Had OBV today with membrane sweep, contractions started to worsen this evening. Supported by her partner, Steffen.    Last growth US: 36w 40% 6l1oz, AC 50% ZOILA 10    Estimated Date of Delivery: 2025    EGA 39w3d    OB History    Para Term  AB Living   3 1 1 0 1 1   SAB IAB Ectopic Multiple Live Births   1 0 0 0 1      # Outcome Date GA Lbr Tra/2nd Weight Sex Type Anes PTL Lv   3 Current            2 Term 23 38w3d 03:50 / 01:21 3.26 kg (7 lb 3 oz) M Vag-Spont EPI N CLIFFORD      Name: LYNETTE BALL,MALE-ELISABET      Apgar1: 8  Apgar5: 9   1 SAB 2022                Lab Results   Component Value Date    AS Negative 2023    GCPCRT Negative 03/10/2020    HGB 11.3 (L) 2023       Prenatal Complications:   1) GBS positive  2) History of gHTN  3) Pre pregnancy BMI 32    Exam:      /82   Temp 97.5  F (36.4  C) (Oral)   Resp 16   Ht 1.575 m (5' 2\")   Wt 84.8 kg (187 lb)   BMI 34.20 kg/m      Fetal heart Rate Category 1  Contractions 3-5 minutes    HEENT grossly normal  Neck: no lymphadenopathy or thryoidomegaly  Lungs CTAB  Heart RRR  ABD gravid, non-tender  EXT:  No edema, moves freely  Vaginal exam 3-4/80/-2 per RN-->4/80/-2 now anterior  Membranes: intact  Cephalic by digital exam    Assessment: labor management and antibiotic therapy  GBS positive  O positive    Plan: Admit - see IP orders  Pain medication as desired. Had an epidural with her first and is open to that or unmedicated birth.   Prophylactic antibiotic for + GBS status  Anticipate   AMTSL    Prenatal record reviewed.    CARA Ruiz Dr. aware of patient status and remains available for consultation and collaboration as needed.  2/15/2025   1:10 " AM

## 2025-02-15 NOTE — PROGRESS NOTES
SVE at 0135 is 4/80/-2, change from 3.5cm. Essence Ashraf CNM notified. Orders received to admit and start penG.

## 2025-02-15 NOTE — ANESTHESIA PREPROCEDURE EVALUATION
Anesthesia Pre-Procedure Evaluation    Patient: Elisabet Logan   MRN: [id number] : 2000        Procedure : * No procedures listed *          History reviewed. No pertinent past medical history.   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      No Known Allergies   Social History     Tobacco Use     Smoking status: Never     Passive exposure: Never     Smokeless tobacco: Never   Substance Use Topics     Alcohol use: Not Currently      Wt Readings from Last 1 Encounters:   02/15/25 84.8 kg (187 lb)        Anesthesia Evaluation   Pt has had prior anesthetic. Type: OB Labor Epidural.        ROS/MED HX  ENT/Pulmonary:     (+)     JORGE LUIS risk factors,                                   Neurologic:       Cardiovascular: Comment: Gestational hypertension      METS/Exercise Tolerance:     Hematologic: Comments: Plts 204 - neg hematologic  ROS     Musculoskeletal:       GI/Hepatic:       Renal/Genitourinary:       Endo:       Psychiatric/Substance Use:       Infectious Disease:       Malignancy:       Other:            Physical Exam    Airway         TM distance: > 3 FB   Neck ROM: full   Mouth opening: > 3 cm    Respiratory Devices and Support         Dental  no notable dental history         Cardiovascular   cardiovascular exam normal          Pulmonary   pulmonary exam normal              OUTSIDE LABS:  CBC:   Lab Results   Component Value Date    WBC 9.5 02/15/2025    WBC 5.5 2023    HGB 10.8 (L) 02/15/2025    HGB 11.3 (L) 2023    HCT 32.1 (L) 02/15/2025    HCT 34.6 (L) 2023     02/15/2025     2023     BMP:   Lab Results   Component Value Date     2023     2021    POTASSIUM 4.2 2023    POTASSIUM 3.8 2021    CHLORIDE 105 2023    CHLORIDE 105 2021    CO2 23 2023    CO2 27 2021    BUN 6.7 2023    BUN 10 2021    CR  "0.60 09/12/2023    CR 0.80 11/08/2021    GLC 96 09/12/2023    GLC 98 11/08/2021     COAGS: No results found for: \"PTT\", \"INR\", \"FIBR\"  POC:   Lab Results   Component Value Date    HCG Negative 11/08/2021     HEPATIC:   Lab Results   Component Value Date    ALBUMIN 3.6 09/12/2023    PROTTOTAL 6.3 (L) 09/12/2023    ALT 7 09/12/2023    AST 16 09/12/2023    ALKPHOS 187 (H) 09/12/2023    BILITOTAL 0.2 09/12/2023     OTHER:   Lab Results   Component Value Date    SAMUEL 8.6 09/12/2023    LIPASE <9 11/08/2021    CRP 0.3 03/10/2020       Anesthesia Plan    ASA Status:  2       Anesthesia Type: Epidural.              Consents    Anesthesia Plan(s) and associated risks, benefits, and realistic alternatives discussed. Questions answered and patient/representative(s) expressed understanding.     - Discussed:     - Discussed with:  Spouse, Patient            Postoperative Care            Comments:    Other Comments: RBA discussed including infection, bleeding, nerve damage, headache, unilateral block, and possible need to replace. Patient expressed understanding and desired to proceed.               Leslie Goldberg, MD    I have reviewed the pertinent notes and labs in the chart from the past 30 days and (re)examined the patient.  Any updates or changes from those notes are reflected in this note.    Clinically Significant Risk Factors Present on Admission                 # Drug Induced Platelet Defect: home medication list includes an antiplatelet medication   # Hypertension: Noted on problem list      # Anemia: based on hgb <11                    "

## 2025-02-15 NOTE — L&D DELIVERY NOTE
Vaginal Delivery Note    Name: Elisabet Logan  : 2000  MRN: [id number]    PRE DELIVERY DIAGNOSIS  24 year old  3 Para 1011 at 39w3d      1) GBS positive  2) History of gHTN  3) Pre pregnancy BMI 32    POST DELIVERY DIAGNOSIS  24 year old  @ 39w3d  Delivery of a viable infant weighing [id number]   via     YOB: 2026    Birth Time: 6:43 AM      Augmentation No              Indication:   Induction No                      Indication:     Monitors: External     GBS: Positive, delivered just after 4 hours prophylaxis completed    ROM: SROM  Fluid Type: Clear    Labor Analgesia/Anesthesia:Epidural    Cord pH obtained: No  Placenta Date/Time: 2/15/2025  6:52 AM  Placenta submitted to Pathology: No    Description of procedure:   24 year old  with PNC w/ MWC and pregnancy complicated by  the above list  presented to L&D with labor contractions.  Her hospital course was uncomplicated.  Patient progressed to complete with spontaneous rupture of membranes. Had membranes swept in clinic and slowly progressed into early labor throughout the day. Minimal cervical change with arrival to triage but visibly uncomfortable and chau regularly. Got an epidural with good relief. 0430 8cm. BOW expressed from vagina and found to be complete at 0630.   Elisabet experienced rapid fetal descent and over the course of only 1-2 contractions brought her baby to quick but controlled crown.  The anterior shoulder delivered easily with gentle downward traction paired with maternal efforts. Elisabet Logan and Steffen welcomed their daughter, Zoraida.   Shoulder Dystocia No  Operative Vaginal Delivery No  Infant spontaneously delivered over an intact perineum with small skid enma in perineum.     Infant delivered in MARINA position.  Nuchal cord Yes    Delivered through    Placenta spontaneously delivered: 2/15/2025  6:52 AM grossly intact with 3 vessel cord.  Infant:  Live, vigorous infant was handed to mom.     Delivery was complicated by nothing Interventions included fundal massage and pitocin.    Delivery QBL (mL): 100    Mother and Infant stable.    CARA Ruiz Dr. remained available for consultation and collaboration as needed.      2/15/2025 7:47 AM

## 2025-02-15 NOTE — ANESTHESIA PROCEDURE NOTES
"Epidural catheter Procedure Note    Pre-Procedure   Staff -        Anesthesiologist:  Goldberg, Leslie, MD       Performed By: anesthesiologist       Location: OB       Procedure Start/Stop Times: 2/15/2025 3:18 AM and 2/15/2025 3:38 AM       Pre-Anesthestic Checklist: patient identified, IV checked, risks and benefits discussed, informed consent, monitors and equipment checked, pre-op evaluation, at physician/surgeon's request and post-op pain management  Timeout:       Correct Patient: Yes        Correct Procedure: Yes        Correct Site: Yes        Correct Position: Yes   Procedure Documentation  Procedure: epidural catheter         Diagnosis: labor analgesia       Patient Position: sitting       Patient Prep/Sterile Barriers: sterile gloves, mask, patient draped       Skin prep: Chloraprep       Local skin infiltrated with mL of 2% lidocaine.        Insertion Site: L2-3. (midline approach).       Technique: LORT saline        PAYTON at 7 cm.       Needle Type: Acid Labs       Needle Gauge: 18.        Needle Length (Inches): 3.5        Catheter: 20 G.          Catheter threaded easily.         5 cm epidural space.         Threaded 12 cm at skin.         # of attempts: 1 and  # of redirects:  0    Assessment/Narrative         Paresthesias: Resolved.       Test dose of 3 mL lidocaine 1.5% w/ 1:200,000 epinephrine at 03:32 CST.         Test dose negative, 3 minutes after injection, for signs of intravascular, subdural, or intrathecal injection.       Insertion/Infusion Method: LORT saline       Aspiration negative for Heme or CSF via Epidural Catheter.    Medication(s) Administered   Medication Administration Time: 2/15/2025 3:18 AM      FOR [facility] ([facility]) ONLY:   Pain Team Contact information: please page the Pain Team Via That{img}. Search \"Pain\". During daytime hours, please page the attending first. At night please page the resident first.      "

## 2025-02-15 NOTE — PLAN OF CARE
Problem: Labor  Goal: Effective Progression to Delivery  Outcome: Met   Problem: Postpartum (Vaginal Delivery)  Goal: Optimal Pain Control and Function  Outcome: Progressing     Pt delivered infant vaginally on 2/15 at 0643. Delivery  mL. Pt resting since delivery. Vitals stable, not up yet since delivery. Denies preeclampsia symptoms, Fundus firm, midline, scant bleeding. Pt managing pain with icepacks. Bonding well with infant.    Letha Walker RN

## 2025-02-15 NOTE — PROGRESS NOTES
Attempt to get up and void at the 2 hr enma was made; unable to ambulate due to feeling dizzy. Will try again at the 4 hr enma at 1100.     Fundus is firm and midline; scant to light bleeding.     ZAC Garcia

## 2025-02-15 NOTE — PROGRESS NOTES
Data: Patient presented to [facility]: 2025 11:58 PM.  Reason for maternal/fetal assessment is uterine contractions. Patient reports chau all day, but increasing in intensity and consistency starting at 2200, starting uterine activity is q5-6min and needs to breath through to cope. Patient denies leaking of vaginal fluid/rupture of membranes, vaginal bleeding, abdominal pain, pelvic pressure, nausea, vomiting, headache, visual disturbances, epigastric or RUQ pain, significant edema. Patient reports fetal movement is normal. Patient is a 39w3d . Prenatal record reviewed. Pregnancy has been uncomplicated, though has a history of GHTN with previous pregnancy, currently taking ASA 81. Support person is present.     Fetal HR baseline was 130, variability is moderate. Accelerations: present. Decelerations: absent. Uterine assessment is mild/moderate during contractions and soft at rest. Cervix 3.5 cm dilated and 80% effaced. Fetal station -2. Fetal presentation   per cervical exam. Membranes: intact.    Vital signs  WNL except /84 . Patient reports pain and is coping.     Action: Verbal consent for EFM. Triage assessment completed. Patient may meet criteria for early labor discharge.     Response: Patient verbalized understanding of triage assessment. Essence Ashraf CNM contacted with assessment and consideration of early labor discharge vs admission. Plan to recheck SVE at 0130. Plan to admit if cervical change.

## 2025-02-16 VITALS
HEART RATE: 61 BPM | RESPIRATION RATE: 16 BRPM | DIASTOLIC BLOOD PRESSURE: 70 MMHG | OXYGEN SATURATION: 97 % | BODY MASS INDEX: 33.13 KG/M2 | TEMPERATURE: 97.5 F | SYSTOLIC BLOOD PRESSURE: 118 MMHG | HEIGHT: 62 IN | WEIGHT: 180 LBS

## 2025-02-16 PROBLEM — O13.9 GESTATIONAL HYPERTENSION: Status: RESOLVED | Noted: 2023-09-15 | Resolved: 2025-02-16

## 2025-02-16 PROBLEM — Z34.90 ENCOUNTER FOR INDUCTION OF LABOR: Status: RESOLVED | Noted: 2023-09-12 | Resolved: 2025-02-16

## 2025-02-16 PROBLEM — Z36.89 ENCOUNTER FOR TRIAGE IN PREGNANT PATIENT: Status: RESOLVED | Noted: 2023-09-12 | Resolved: 2025-02-16

## 2025-02-16 PROCEDURE — 250N000013 HC RX MED GY IP 250 OP 250 PS 637: Performed by: REGISTERED NURSE

## 2025-02-16 RX ORDER — IBUPROFEN 800 MG/1
800 TABLET, FILM COATED ORAL EVERY 8 HOURS PRN
Qty: 30 TABLET | Refills: 1 | Status: SHIPPED | OUTPATIENT
Start: 2025-02-16

## 2025-02-16 RX ORDER — ACETAMINOPHEN 325 MG/1
650 TABLET ORAL EVERY 4 HOURS PRN
COMMUNITY
Start: 2025-02-16

## 2025-02-16 RX ADMIN — ACETAMINOPHEN 650 MG: 325 TABLET ORAL at 01:26

## 2025-02-16 RX ADMIN — IBUPROFEN 800 MG: 800 TABLET ORAL at 01:26

## 2025-02-16 RX ADMIN — IBUPROFEN 800 MG: 800 TABLET ORAL at 09:25

## 2025-02-16 RX ADMIN — PSYLLIUM HUSK 1 PACKET: 3.4 POWDER ORAL at 09:24

## 2025-02-16 RX ADMIN — ACETAMINOPHEN 650 MG: 325 TABLET ORAL at 06:34

## 2025-02-16 ASSESSMENT — ACTIVITIES OF DAILY LIVING (ADL)
ADLS_ACUITY_SCORE: 25

## 2025-02-16 NOTE — PROGRESS NOTES
Patient meets discharge criteria.     I reviewed discharge instructions with the patient and answered any remaining questions.     ZAC Garcia

## 2025-02-16 NOTE — PLAN OF CARE
Problem: Adult Inpatient Plan of Care  Goal: Plan of Care Review  Description: The Plan of Care Review/Shift note should be completed every shift.  The Outcome Evaluation is a brief statement about your assessment that the patient is improving, declining, or no change.  This information will be displayed automatically on your shift  note.  Outcome: Progressing     Problem: Adult Inpatient Plan of Care  Goal: Absence of Hospital-Acquired Illness or Injury  Outcome: Progressing  Intervention: Prevent Skin Injury  Recent Flowsheet Documentation  Taken 2/16/2025 0130 by Maite Jacob RN  Body Position: position changed independently  Taken 2/15/2025 2130 by Maite Jacob RN  Body Position: position changed independently     Problem: Postpartum (Vaginal Delivery)  Goal: Hemostasis  Outcome: Progressing     Problem: Postpartum (Vaginal Delivery)  Goal: Optimal Pain Control and Function  Outcome: Progressing     Problem: Postpartum (Vaginal Delivery)  Goal: Effective Urinary Elimination  Outcome: Progressing   Goal Outcome Evaluation:  Pt vitals stable overnight. Scant bleeding overnight. Ibuprofen and tylenol given for pain overnight.

## 2025-02-16 NOTE — PROGRESS NOTES
"Vaginal Delivery Postpartum Day 1    Patient Name:  Elisabet Logan  :      2000  MRN:      [id number]      Assessment:  Normal postpartum course.     Plan:  Continue current care. Discharge home today per patient request.    Subjective:  The patient feels well:  Voiding without difficulty, lochia normal, tolerating normal diet, ambulating without difficulty and passing flatus. Denies headache, dizziness, and SOB. Pain is well controlled with current medications. She reports no emotional concerns. The baby is well and being fed by bottle.      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 1st baby    Objective:  /70 (BP Location: Left arm, Patient Position: Semi-Olson's, Cuff Size: Adult Regular)   Pulse 61   Temp 97.5  F (36.4  C) (Oral)   Resp 16   Ht 1.575 m (5' 2\")   Wt 84.8 kg (187 lb)   SpO2 97%   Breastfeeding Unknown   BMI 34.20 kg/m    Patient Vitals for the past 24 hrs:   BP Temp Temp src Pulse Resp SpO2   25 0635 118/70 97.5  F (36.4  C) Oral 61 16 97 %   25 0127 113/60 97.5  F (36.4  C) Oral 78 16 97 %   02/15/25 2129 126/58 97.9  F (36.6  C) Oral 75 18 98 %   02/15/25 1750 114/61 97.8  F (36.6  C) Oral 73 16 --   02/15/25 1330 107/58 98.1  F (36.7  C) Oral 68 16 --       Exam: Patient A&O x 3. No acute distress, breathing unlabored.The amount and color of the lochia is appropriate for the duration of recovery. Nursing notes reviewed.     Lab Results   Component Value Date    AS Negative 02/15/2025    GCPCRT Negative 03/10/2020    HGB 10.8 (L) 02/15/2025       Immunization History   Administered Date(s) Administered    COVID-19 MONOVALENT 12+ (Pfizer) 2021, 2021    TDAP (Adacel,Boostrix) 2010, 2023, 2024       Provider:  TACOS Chow CNM    Date:  2025  Time:  11:22 AM    "

## 2025-02-16 NOTE — ANESTHESIA POSTPROCEDURE EVALUATION
Patient: Elisabet Ragsdale Bangladeshi    Procedure: * No procedures listed *       Anesthesia Type:  No value filed.    Note:  Disposition: Inpatient   Postop Pain Control: Uneventful            Sign Out: Well controlled pain   PONV: No   Neuro/Psych: Uneventful            Sign Out: Acceptable/Baseline neuro status   Airway/Respiratory: Uneventful            Sign Out: Acceptable/Baseline resp. status   CV/Hemodynamics: Uneventful            Sign Out: Acceptable CV status; No obvious hypovolemia; No obvious fluid overload   Other NRE:    DID A NON-ROUTINE EVENT OCCUR?     Event details/Postop Comments:  Routine recovery. No anesthesia concerns. All questions answered. Encouraged to have RN get in touch with anesthesia with any questions or concerns.         Last vitals:  Vitals:    02/15/25 2129 02/16/25 0127 02/16/25 0635   BP: 126/58 113/60 118/70   Pulse: 75 78 61   Resp: 18 16 16   Temp: 36.6  C (97.9  F) 36.4  C (97.5  F) 36.4  C (97.5  F)   SpO2: 98% 97% 97%       Electronically Signed By: Leslie Goldberg, MD  February 16, 2025  10:25 AM

## 2025-02-16 NOTE — DISCHARGE SUMMARY
OB Discharge Summary      Date:  2025    Name:  Elisabet Logan  :  2000  MRN:  [id number]      Admission Date:  2025  Delivery Date: 2/15/2025  Gestational Age at Delivery:  39w3d  Discharge Date:  2025    Principal Diagnosis:    Patient Active Problem List   Diagnosis    Term pregnancy     (normal spontaneous vaginal delivery)         Conditions complicating Pregnancy:   1) GBS positive  2) History of gHTN  3) Pre pregnancy BMI 32  4) History of PP depression after first baby    Procedure(s) Performed:      Indication for :  n/a  Indication for Induction:  n/a     Condition at Discharge:  good    Discharge Medications:      Review of your medicines        START taking        Dose / Directions   ibuprofen 800 MG tablet  Commonly known as: ADVIL/MOTRIN  Used for: Postpartum care and examination of lactating mother      Dose: 800 mg  Take 1 tablet (800 mg) by mouth every 8 hours as needed for other (first line or per patient preference for mild to moderate pain management).  Quantity: 30 tablet  Refills: 1            CONTINUE these medicines which may have CHANGED, or have new prescriptions. If we are uncertain of the size of tablets/capsules you have at home, strength may be listed as something that might have changed.        Dose / Directions   * acetaminophen 325 MG tablet  Commonly known as: TYLENOL  This may have changed: Another medication with the same name was added. Make sure you understand how and when to take each.  Used for:  (normal spontaneous vaginal delivery)      Dose: 975 mg  Take 3 tablets (975 mg) by mouth every 6 hours as needed for mild pain  Quantity: 50 tablet  Refills: 0     * acetaminophen 325 MG tablet  Commonly known as: TYLENOL  This may have changed: You were already taking a medication with the same name, and this prescription was added. Make sure you understand how and when to take each.  Used for: Postpartum care and examination of lactating  mother      Dose: 650 mg  Take 2 tablets (650 mg) by mouth every 4 hours as needed for fever or other (second line or per patient preference for mild to moderate pain management).  Refills: 0           * This list has 2 medication(s) that are the same as other medications prescribed for you. Read the directions carefully, and ask your doctor or other care provider to review them with you.                CONTINUE these medicines which have NOT CHANGED        Dose / Directions   docusate sodium 100 MG capsule  Commonly known as: COLACE  Used for:  (normal spontaneous vaginal delivery)      Dose: 100 mg  Take 1 capsule (100 mg) by mouth daily as needed for constipation  Quantity: 60 capsule  Refills: 0     prenatal multivitamin  plus iron 27-1 MG Tabs      Take by mouth daily.  Refills: 0            STOP taking      Aspirin Low Dose 81 MG chewable tablet  Generic drug: aspirin                  Where to get your medicines        These medications were sent to [facility] [place]  [place] - [address]  [address] [place] [address]      Phone: [phone]   ibuprofen 800 MG tablet       Some of these will need a paper prescription and others can be bought over the counter. Ask your nurse if you have questions.    You don't need a prescription for these medications  acetaminophen 325 MG tablet          Discharge Plan:               Follow up with your provider at [facility] in 6 weeks, sooner as needed.               Patient Instructions:                            Physical activity: Ok to resume your normal activities. Nothing in the vagina for 6 weeks.                           Diet:  Regular                          Medication: Continue taking your prenatal vitamin. You can take ibuprofen and tylenol as needed for pain- ibuprofen is more helpful for cramping, swelling, and muscular pain. Tylenol is more helpful for generalized pain. You can continue taking a stool softener daily as  needed for constipation.            Call [facility] if you have any of the following:   Heavy bleeding (filling one pad within one to two hours).  Blood clots larger than the size of a golf ball, especially with heavy bleeding.  Vaginal discharge with foul odor or green color.  Fever (oral temperature over 100.3 F).  Signs of bladder infection (burning, frequent urination)  Painful, hard lump in breast with red streaks and/or fever.  Worsening vaginal pain or tissue coming out of vagina.  Worsening abdominal pain or tenderness   Signs of depression or anxiety, affecting sleep or activities of daily living.  Headaches not resolved with Tylenol, visual disturbances, severe upper abdominal pain or burning.  Blood pressure 140+/90+        Physician/CNM: TACOS ChowM    Name:  Elisabet Logan  :  2000  MRN:  [id number]

## 2025-02-16 NOTE — PLAN OF CARE
Problem: Adult Inpatient Plan of Care  Goal: Optimal Comfort and Wellbeing  Outcome: Progressing  Intervention: Monitor Pain and Promote Comfort  Recent Flowsheet Documentation  Taken 2/15/2025 1750 by Christa Cancino RN  Pain Management Interventions: medication (see MAR)  Intervention: Provide Person-Centered Care  Recent Flowsheet Documentation  Taken 2/15/2025 1330 by Christa Cancino RN  Trust Relationship/Rapport:   care explained   choices provided   emotional support provided   empathic listening provided   questions answered   questions encouraged   reassurance provided   thoughts/feelings acknowledged   Goal Outcome Evaluation:      Plan of Care Reviewed With: patient        Patient taking ibuprofen for pain. Has been voiding without difficulty.

## 2025-02-18 ENCOUNTER — MEDICAL CORRESPONDENCE (OUTPATIENT)
Dept: HEALTH INFORMATION MANAGEMENT | Facility: CLINIC | Age: 25
End: 2025-02-18
Payer: COMMERCIAL

## 2025-02-18 ENCOUNTER — PATIENT OUTREACH (OUTPATIENT)
Dept: CARE COORDINATION | Facility: CLINIC | Age: 25
End: 2025-02-18
Payer: COMMERCIAL

## 2025-02-18 NOTE — PROGRESS NOTES
[facility] Care Resource Center Contact  [facility]/Voicemail     Clinical Data: Post-Discharge Outreach     Outreach attempted x 2.  Left message on patient's voicemail, providing [facility]'s central phone number of [phone] ([phone]) for questions/concerns and/or to schedule an appt with an [facility] provider, if they do not have a PCP.      Plan:  [facility] will do no further outreaches at this time.     CARMEN Mckoy  [phone]  [facility]     *Connected Care Resource Team does NOT follow patient ongoing. Referrals are identified based on internal discharge reports and the outreach is to ensure patient has an understanding of their discharge instructions.

## 2025-06-19 ENCOUNTER — MEDICAL CORRESPONDENCE (OUTPATIENT)
Dept: HEALTH INFORMATION MANAGEMENT | Facility: CLINIC | Age: 25
End: 2025-06-19
Payer: COMMERCIAL

## 2025-08-28 ENCOUNTER — MEDICAL CORRESPONDENCE (OUTPATIENT)
Dept: HEALTH INFORMATION MANAGEMENT | Facility: CLINIC | Age: 25
End: 2025-08-28
Payer: COMMERCIAL